# Patient Record
Sex: FEMALE | Race: WHITE | Employment: PART TIME | URBAN - METROPOLITAN AREA
[De-identification: names, ages, dates, MRNs, and addresses within clinical notes are randomized per-mention and may not be internally consistent; named-entity substitution may affect disease eponyms.]

---

## 2017-12-16 PROBLEM — Z34.93 NORMAL PREGNANCY IN THIRD TRIMESTER: Status: ACTIVE | Noted: 2017-12-16

## 2018-02-13 PROBLEM — Z3A.39 39 WEEKS GESTATION OF PREGNANCY: Status: ACTIVE | Noted: 2018-02-13

## 2018-12-15 ENCOUNTER — HOSPITAL ENCOUNTER (OUTPATIENT)
Dept: ULTRASOUND IMAGING | Age: 28
Discharge: HOME | End: 2018-12-17
Payer: COMMERCIAL

## 2018-12-15 DIAGNOSIS — Z34.90 PREGNANCY, UNSPECIFIED GESTATIONAL AGE: ICD-10-CM

## 2018-12-15 PROCEDURE — 76801 OB US < 14 WKS SINGLE FETUS: CPT

## 2018-12-27 ENCOUNTER — HOSPITAL ENCOUNTER (OUTPATIENT)
Age: 28
Discharge: HOME | End: 2018-12-27
Payer: COMMERCIAL

## 2018-12-27 LAB
ABO/RH: NORMAL
ANTIBODY SCREEN: NORMAL
BASOPHILS ABSOLUTE: 0.04 E9/L (ref 0–0.2)
BASOPHILS RELATIVE PERCENT: 0.4 % (ref 0–2)
EOSINOPHILS ABSOLUTE: 0.01 E9/L (ref 0.05–0.5)
EOSINOPHILS RELATIVE PERCENT: 0.1 % (ref 0–6)
HCT VFR BLD CALC: 41.5 % (ref 34–48)
HEMOGLOBIN: 14.1 G/DL (ref 11.5–15.5)
IMMATURE GRANULOCYTES #: 0.05 E9/L
IMMATURE GRANULOCYTES %: 0.5 % (ref 0–5)
LYMPHOCYTES ABSOLUTE: 1.22 E9/L (ref 1.5–4)
LYMPHOCYTES RELATIVE PERCENT: 12.2 % (ref 20–42)
MCH RBC QN AUTO: 31.1 PG (ref 26–35)
MCHC RBC AUTO-ENTMCNC: 34 % (ref 32–34.5)
MCV RBC AUTO: 91.4 FL (ref 80–99.9)
MONOCYTES ABSOLUTE: 0.49 E9/L (ref 0.1–0.95)
MONOCYTES RELATIVE PERCENT: 4.9 % (ref 2–12)
NEUTROPHILS ABSOLUTE: 8.22 E9/L (ref 1.8–7.3)
NEUTROPHILS RELATIVE PERCENT: 81.9 % (ref 43–80)
PDW BLD-RTO: 12.7 FL (ref 11.5–15)
PLATELET # BLD: 152 E9/L (ref 130–450)
PMV BLD AUTO: 13 FL (ref 7–12)
RBC # BLD: 4.54 E12/L (ref 3.5–5.5)
TSH SERPL DL<=0.05 MIU/L-ACNC: 0.37 UIU/ML (ref 0.27–4.2)
WBC # BLD: 10 E9/L (ref 4.5–11.5)

## 2018-12-27 PROCEDURE — 86592 SYPHILIS TEST NON-TREP QUAL: CPT

## 2018-12-27 PROCEDURE — 87340 HEPATITIS B SURFACE AG IA: CPT

## 2018-12-27 PROCEDURE — 86703 HIV-1/HIV-2 1 RESULT ANTBDY: CPT

## 2018-12-27 PROCEDURE — 85025 COMPLETE CBC W/AUTO DIFF WBC: CPT

## 2018-12-27 PROCEDURE — 36415 COLL VENOUS BLD VENIPUNCTURE: CPT

## 2018-12-27 PROCEDURE — 84443 ASSAY THYROID STIM HORMONE: CPT

## 2018-12-27 PROCEDURE — 86803 HEPATITIS C AB TEST: CPT

## 2018-12-27 PROCEDURE — 86900 BLOOD TYPING SEROLOGIC ABO: CPT

## 2018-12-27 PROCEDURE — 86850 RBC ANTIBODY SCREEN: CPT

## 2018-12-27 PROCEDURE — 86901 BLOOD TYPING SEROLOGIC RH(D): CPT

## 2018-12-27 PROCEDURE — 86762 RUBELLA ANTIBODY: CPT

## 2018-12-28 LAB
HEPATITIS B SURFACE ANTIGEN INTERPRETATION: NORMAL
HEPATITIS C ANTIBODY INTERPRETATION: NORMAL
HIV-1 AND HIV-2 ANTIBODIES: NORMAL
RPR: NORMAL

## 2019-01-03 LAB — RUBELLA ANTIBODY IGG: NORMAL

## 2019-06-05 ENCOUNTER — HOSPITAL ENCOUNTER (OUTPATIENT)
Age: 29
Discharge: HOME | End: 2019-06-05
Attending: OBSTETRICS & GYNECOLOGY | Admitting: OBSTETRICS & GYNECOLOGY
Payer: COMMERCIAL

## 2019-06-05 VITALS
RESPIRATION RATE: 16 BRPM | HEART RATE: 88 BPM | TEMPERATURE: 98 F | SYSTOLIC BLOOD PRESSURE: 106 MMHG | DIASTOLIC BLOOD PRESSURE: 58 MMHG

## 2019-06-05 PROBLEM — R10.9 ABDOMINAL PAIN DURING PREGNANCY, THIRD TRIMESTER: Status: ACTIVE | Noted: 2019-06-05

## 2019-06-05 PROBLEM — O26.893 ABDOMINAL PAIN DURING PREGNANCY, THIRD TRIMESTER: Status: ACTIVE | Noted: 2019-06-05

## 2019-06-05 LAB
BACTERIA: ABNORMAL /HPF
BILIRUBIN URINE: NEGATIVE
BILIRUBIN URINE: NEGATIVE
BLOOD, URINE: ABNORMAL
BLOOD, URINE: NEGATIVE
CLARITY: ABNORMAL
CLARITY: CLEAR
COLOR: NORMAL
COLOR: YELLOW
EPITHELIAL CELLS, UA: ABNORMAL /HPF
FETAL FIBRONECTIN: NEGATIVE
GLUCOSE URINE: NEGATIVE MG/DL
GLUCOSE URINE: NEGATIVE MG/DL
KETONES, URINE: NEGATIVE MG/DL
KETONES, URINE: NEGATIVE MG/DL
LEUKOCYTE ESTERASE, URINE: ABNORMAL
LEUKOCYTE ESTERASE, URINE: NEGATIVE
NITRITE, URINE: NEGATIVE
NITRITE, URINE: NEGATIVE
PH UA: 6.5 (ref 5–9)
PH UA: 6.5 (ref 5–9)
PROTEIN UA: NEGATIVE MG/DL
PROTEIN UA: NEGATIVE MG/DL
RBC UA: ABNORMAL /HPF (ref 0–2)
SPECIFIC GRAVITY UA: 1.02 (ref 1–1.03)
SPECIFIC GRAVITY UA: <=1.005 (ref 1–1.03)
UROBILINOGEN, URINE: 0.2 E.U./DL
UROBILINOGEN, URINE: 0.2 E.U./DL
WBC UA: >20 /HPF (ref 0–5)

## 2019-06-05 PROCEDURE — 99211 OFF/OP EST MAY X REQ PHY/QHP: CPT

## 2019-06-05 PROCEDURE — 81003 URINALYSIS AUTO W/O SCOPE: CPT

## 2019-06-05 PROCEDURE — 99213 OFFICE O/P EST LOW 20 MIN: CPT | Performed by: NURSE PRACTITIONER

## 2019-06-05 PROCEDURE — 81001 URINALYSIS AUTO W/SCOPE: CPT

## 2019-06-05 PROCEDURE — 82731 ASSAY OF FETAL FIBRONECTIN: CPT

## 2019-06-05 PROCEDURE — 59025 FETAL NON-STRESS TEST: CPT

## 2019-06-05 RX ORDER — RANITIDINE HCL 75 MG
75 TABLET ORAL 2 TIMES DAILY
Status: ON HOLD | COMMUNITY
End: 2019-07-20 | Stop reason: HOSPADM

## 2019-06-06 NOTE — PROGRESS NOTES
Discharge instructions provided and explained. Patient verbalized understanding. Will keep upcoming appointment. Ambulated to car with .

## 2019-07-18 RX ORDER — SODIUM CHLORIDE 0.9 % (FLUSH) 0.9 %
10 SYRINGE (ML) INJECTION PRN
Status: CANCELLED | OUTPATIENT
Start: 2019-07-18

## 2019-07-18 RX ORDER — SODIUM CHLORIDE 0.9 % (FLUSH) 0.9 %
10 SYRINGE (ML) INJECTION EVERY 12 HOURS SCHEDULED
Status: CANCELLED | OUTPATIENT
Start: 2019-07-18

## 2019-07-18 RX ORDER — DOCUSATE SODIUM 100 MG/1
100 CAPSULE, LIQUID FILLED ORAL 2 TIMES DAILY
Status: CANCELLED | OUTPATIENT
Start: 2019-07-18

## 2019-07-18 RX ORDER — ACETAMINOPHEN 325 MG/1
650 TABLET ORAL EVERY 4 HOURS PRN
Status: CANCELLED | OUTPATIENT
Start: 2019-07-18

## 2019-07-18 RX ORDER — SODIUM CHLORIDE, SODIUM LACTATE, POTASSIUM CHLORIDE, CALCIUM CHLORIDE 600; 310; 30; 20 MG/100ML; MG/100ML; MG/100ML; MG/100ML
INJECTION, SOLUTION INTRAVENOUS CONTINUOUS
Status: CANCELLED | OUTPATIENT
Start: 2019-07-18

## 2019-07-18 RX ORDER — BUTORPHANOL TARTRATE 1 MG/ML
1 INJECTION, SOLUTION INTRAMUSCULAR; INTRAVENOUS
Status: CANCELLED | OUTPATIENT
Start: 2019-07-18

## 2019-07-18 RX ORDER — ONDANSETRON 2 MG/ML
4 INJECTION INTRAMUSCULAR; INTRAVENOUS EVERY 6 HOURS PRN
Status: CANCELLED | OUTPATIENT
Start: 2019-07-18

## 2019-07-19 ENCOUNTER — ANESTHESIA (OUTPATIENT)
Dept: LABOR AND DELIVERY | Age: 29
End: 2019-07-19
Payer: COMMERCIAL

## 2019-07-19 ENCOUNTER — ANESTHESIA EVENT (OUTPATIENT)
Dept: LABOR AND DELIVERY | Age: 29
End: 2019-07-19
Payer: COMMERCIAL

## 2019-07-19 ENCOUNTER — HOSPITAL ENCOUNTER (INPATIENT)
Age: 29
LOS: 2 days | Discharge: HOME | End: 2019-07-21
Attending: OBSTETRICS & GYNECOLOGY | Admitting: OBSTETRICS & GYNECOLOGY
Payer: COMMERCIAL

## 2019-07-19 ENCOUNTER — APPOINTMENT (OUTPATIENT)
Dept: LABOR AND DELIVERY | Age: 29
End: 2019-07-19
Payer: COMMERCIAL

## 2019-07-19 PROBLEM — Z34.90 NORMAL PREGNANCY, ANTEPARTUM: Status: ACTIVE | Noted: 2019-07-19

## 2019-07-19 LAB
ABO/RH: NORMAL
AMPHETAMINE SCREEN, URINE: NOT DETECTED
ANTIBODY SCREEN: NORMAL
BARBITURATE SCREEN URINE: NOT DETECTED
BENZODIAZEPINE SCREEN, URINE: NOT DETECTED
CANNABINOID SCREEN URINE: NOT DETECTED
COCAINE METABOLITE SCREEN URINE: NOT DETECTED
HCT VFR BLD CALC: 31.1 % (ref 34–48)
HEMOGLOBIN: 10.2 G/DL (ref 11.5–15.5)
MCH RBC QN AUTO: 29 PG (ref 26–35)
MCHC RBC AUTO-ENTMCNC: 32.8 % (ref 32–34.5)
MCV RBC AUTO: 88.4 FL (ref 80–99.9)
METHADONE SCREEN, URINE: NOT DETECTED
OPIATE SCREEN URINE: NOT DETECTED
PDW BLD-RTO: 13.1 FL (ref 11.5–15)
PHENCYCLIDINE SCREEN URINE: NOT DETECTED
PLATELET # BLD: 119 E9/L (ref 130–450)
PMV BLD AUTO: 13.1 FL (ref 7–12)
PROPOXYPHENE SCREEN: NOT DETECTED
RBC # BLD: 3.52 E12/L (ref 3.5–5.5)
WBC # BLD: 9 E9/L (ref 4.5–11.5)

## 2019-07-19 PROCEDURE — 6370000000 HC RX 637 (ALT 250 FOR IP): Performed by: OBSTETRICS & GYNECOLOGY

## 2019-07-19 PROCEDURE — 86901 BLOOD TYPING SEROLOGIC RH(D): CPT

## 2019-07-19 PROCEDURE — 3E033VJ INTRODUCTION OF OTHER HORMONE INTO PERIPHERAL VEIN, PERCUTANEOUS APPROACH: ICD-10-PCS | Performed by: OBSTETRICS & GYNECOLOGY

## 2019-07-19 PROCEDURE — 86850 RBC ANTIBODY SCREEN: CPT

## 2019-07-19 PROCEDURE — 2580000003 HC RX 258: Performed by: OBSTETRICS & GYNECOLOGY

## 2019-07-19 PROCEDURE — 86900 BLOOD TYPING SEROLOGIC ABO: CPT

## 2019-07-19 PROCEDURE — 6360000002 HC RX W HCPCS: Performed by: OBSTETRICS & GYNECOLOGY

## 2019-07-19 PROCEDURE — 36415 COLL VENOUS BLD VENIPUNCTURE: CPT

## 2019-07-19 PROCEDURE — 3700000025 EPIDURAL BLOCK: Performed by: ANESTHESIOLOGY

## 2019-07-19 PROCEDURE — 0HQ9XZZ REPAIR PERINEUM SKIN, EXTERNAL APPROACH: ICD-10-PCS | Performed by: OBSTETRICS & GYNECOLOGY

## 2019-07-19 PROCEDURE — 1220000000 HC SEMI PRIVATE OB R&B

## 2019-07-19 PROCEDURE — 85027 COMPLETE CBC AUTOMATED: CPT

## 2019-07-19 PROCEDURE — 80307 DRUG TEST PRSMV CHEM ANLYZR: CPT

## 2019-07-19 PROCEDURE — 7200000001 HC VAGINAL DELIVERY

## 2019-07-19 PROCEDURE — 88307 TISSUE EXAM BY PATHOLOGIST: CPT

## 2019-07-19 RX ORDER — SODIUM CHLORIDE 0.9 % (FLUSH) 0.9 %
10 SYRINGE (ML) INJECTION PRN
Status: DISCONTINUED | OUTPATIENT
Start: 2019-07-19 | End: 2019-07-19 | Stop reason: HOSPADM

## 2019-07-19 RX ORDER — ACETAMINOPHEN 325 MG/1
650 TABLET ORAL EVERY 4 HOURS PRN
Status: DISCONTINUED | OUTPATIENT
Start: 2019-07-19 | End: 2019-07-19 | Stop reason: HOSPADM

## 2019-07-19 RX ORDER — ACETAMINOPHEN 325 MG/1
650 TABLET ORAL EVERY 4 HOURS PRN
Status: DISCONTINUED | OUTPATIENT
Start: 2019-07-19 | End: 2019-07-21 | Stop reason: HOSPADM

## 2019-07-19 RX ORDER — SODIUM CHLORIDE, SODIUM LACTATE, POTASSIUM CHLORIDE, CALCIUM CHLORIDE 600; 310; 30; 20 MG/100ML; MG/100ML; MG/100ML; MG/100ML
INJECTION, SOLUTION INTRAVENOUS CONTINUOUS
Status: DISCONTINUED | OUTPATIENT
Start: 2019-07-19 | End: 2019-07-21 | Stop reason: HOSPADM

## 2019-07-19 RX ORDER — FERROUS SULFATE 325(65) MG
325 TABLET ORAL 2 TIMES DAILY WITH MEALS
Status: DISCONTINUED | OUTPATIENT
Start: 2019-07-19 | End: 2019-07-21 | Stop reason: HOSPADM

## 2019-07-19 RX ORDER — LIDOCAINE HYDROCHLORIDE 10 MG/ML
INJECTION, SOLUTION INFILTRATION; PERINEURAL
Status: DISCONTINUED
Start: 2019-07-19 | End: 2019-07-19 | Stop reason: WASHOUT

## 2019-07-19 RX ORDER — NALOXONE HYDROCHLORIDE 0.4 MG/ML
0.4 INJECTION, SOLUTION INTRAMUSCULAR; INTRAVENOUS; SUBCUTANEOUS PRN
Status: DISCONTINUED | OUTPATIENT
Start: 2019-07-19 | End: 2019-07-19 | Stop reason: HOSPADM

## 2019-07-19 RX ORDER — IBUPROFEN 800 MG/1
800 TABLET ORAL EVERY 8 HOURS PRN
Status: DISCONTINUED | OUTPATIENT
Start: 2019-07-19 | End: 2019-07-21 | Stop reason: HOSPADM

## 2019-07-19 RX ORDER — SODIUM CHLORIDE 0.9 % (FLUSH) 0.9 %
10 SYRINGE (ML) INJECTION PRN
Status: DISCONTINUED | OUTPATIENT
Start: 2019-07-19 | End: 2019-07-21 | Stop reason: HOSPADM

## 2019-07-19 RX ORDER — ACETAMINOPHEN 650 MG
TABLET, EXTENDED RELEASE ORAL
Status: DISPENSED
Start: 2019-07-19 | End: 2019-07-19

## 2019-07-19 RX ORDER — DOCUSATE SODIUM 100 MG/1
100 CAPSULE, LIQUID FILLED ORAL 2 TIMES DAILY
Status: DISCONTINUED | OUTPATIENT
Start: 2019-07-19 | End: 2019-07-21 | Stop reason: HOSPADM

## 2019-07-19 RX ORDER — SODIUM CHLORIDE 0.9 % (FLUSH) 0.9 %
10 SYRINGE (ML) INJECTION EVERY 12 HOURS SCHEDULED
Status: DISCONTINUED | OUTPATIENT
Start: 2019-07-19 | End: 2019-07-21 | Stop reason: HOSPADM

## 2019-07-19 RX ORDER — NALBUPHINE HCL 10 MG/ML
5 AMPUL (ML) INJECTION EVERY 4 HOURS PRN
Status: DISCONTINUED | OUTPATIENT
Start: 2019-07-19 | End: 2019-07-19 | Stop reason: HOSPADM

## 2019-07-19 RX ORDER — LANOLIN 100 %
OINTMENT (GRAM) TOPICAL PRN
Status: DISCONTINUED | OUTPATIENT
Start: 2019-07-19 | End: 2019-07-21 | Stop reason: HOSPADM

## 2019-07-19 RX ORDER — SODIUM CHLORIDE 0.9 % (FLUSH) 0.9 %
10 SYRINGE (ML) INJECTION EVERY 12 HOURS SCHEDULED
Status: DISCONTINUED | OUTPATIENT
Start: 2019-07-19 | End: 2019-07-19 | Stop reason: HOSPADM

## 2019-07-19 RX ORDER — ONDANSETRON 2 MG/ML
4 INJECTION INTRAMUSCULAR; INTRAVENOUS EVERY 6 HOURS PRN
Status: DISCONTINUED | OUTPATIENT
Start: 2019-07-19 | End: 2019-07-19 | Stop reason: HOSPADM

## 2019-07-19 RX ORDER — DOCUSATE SODIUM 100 MG/1
100 CAPSULE, LIQUID FILLED ORAL 2 TIMES DAILY
Status: DISCONTINUED | OUTPATIENT
Start: 2019-07-19 | End: 2019-07-19

## 2019-07-19 RX ADMIN — SODIUM CHLORIDE, POTASSIUM CHLORIDE, SODIUM LACTATE AND CALCIUM CHLORIDE: 600; 310; 30; 20 INJECTION, SOLUTION INTRAVENOUS at 07:00

## 2019-07-19 RX ADMIN — IBUPROFEN 800 MG: 800 TABLET, FILM COATED ORAL at 20:48

## 2019-07-19 RX ADMIN — Medication 1 MILLI-UNITS/MIN: at 07:45

## 2019-07-19 RX ADMIN — DOCUSATE SODIUM 100 MG: 100 CAPSULE, LIQUID FILLED ORAL at 20:48

## 2019-07-19 RX ADMIN — SODIUM CHLORIDE, POTASSIUM CHLORIDE, SODIUM LACTATE AND CALCIUM CHLORIDE: 600; 310; 30; 20 INJECTION, SOLUTION INTRAVENOUS at 10:48

## 2019-07-19 RX ADMIN — BENZOCAINE AND LEVOMENTHOL: 200; 5 SPRAY TOPICAL at 23:46

## 2019-07-19 ASSESSMENT — PAIN - FUNCTIONAL ASSESSMENT: PAIN_FUNCTIONAL_ASSESSMENT: ACTIVITIES ARE NOT PREVENTED

## 2019-07-19 ASSESSMENT — PAIN DESCRIPTION - PAIN TYPE: TYPE: ACUTE PAIN

## 2019-07-19 ASSESSMENT — PAIN DESCRIPTION - DESCRIPTORS
DESCRIPTORS: CRAMPING;DISCOMFORT
DESCRIPTORS: CRAMPING;DISCOMFORT;SORE

## 2019-07-19 ASSESSMENT — PAIN DESCRIPTION - FREQUENCY: FREQUENCY: INTERMITTENT

## 2019-07-19 ASSESSMENT — PAIN SCALES - GENERAL
PAINLEVEL_OUTOF10: 6
PAINLEVEL_OUTOF10: 2

## 2019-07-19 ASSESSMENT — PAIN DESCRIPTION - LOCATION
LOCATION: BACK;ABDOMEN
LOCATION: ABDOMEN

## 2019-07-19 NOTE — ANESTHESIA PRE PROCEDURE
Normal pregnancy in third trimester Z34.93    39 weeks gestation of pregnancy Z3A.39    Abdominal pain during pregnancy, third trimester O26.893, R10.9    Normal pregnancy, antepartum Z34.90       Past Medical History:        Diagnosis Date    Abnormal Pap smear of cervix     hpv leep        Past Surgical History:        Procedure Laterality Date    BREAST REDUCTION SURGERY  2009    BREAST REDUCTION SURGERY  2009    LEEP  2012    WISDOM TOOTH EXTRACTION  2014       Social History:    Social History     Tobacco Use    Smoking status: Never Smoker    Smokeless tobacco: Never Used   Substance Use Topics    Alcohol use: No                                Counseling given: Not Answered      Vital Signs (Current):   Vitals:    07/19/19 0847 07/19/19 0918 07/19/19 0947 07/19/19 1045   BP: 111/62 (!) 97/53 (!) 105/55 109/63   Pulse: 86 71 73 80   Resp:       Temp:       TempSrc:       Weight:       Height:                                                  BP Readings from Last 3 Encounters:   07/19/19 109/63   06/05/19 (!) 106/58   02/19/18 111/64       NPO Status: Time of last liquid consumption: 0530                        Time of last solid consumption: 0530                        Date of last liquid consumption: 07/19/19                        Date of last solid food consumption: 07/19/19    BMI:   Wt Readings from Last 3 Encounters:   07/19/19 169 lb (76.7 kg)   02/16/18 170 lb (77.1 kg)   10/09/17 150 lb (68 kg)     Body mass index is 29.94 kg/m². CBC:   Lab Results   Component Value Date    WBC 9.0 07/19/2019    RBC 3.52 07/19/2019    HGB 10.2 07/19/2019    HCT 31.1 07/19/2019    MCV 88.4 07/19/2019    RDW 13.1 07/19/2019     07/19/2019       CMP: No results found for: NA, K, CL, CO2, BUN, CREATININE, GFRAA, AGRATIO, LABGLOM, GLUCOSE, PROT, CALCIUM, BILITOT, ALKPHOS, AST, ALT    POC Tests: No results for input(s): POCGLU, POCNA, POCK, POCCL, POCBUN, POCHEMO, POCHCT in the last 72 hours.     Coags:

## 2019-07-19 NOTE — H&P
Labor & Delivery History & Physical     CHIEF COMPLAINT:  IOL    HISTORY OF PRESENT ILLNESS:      The patient is a 34 y.o. female  at 43w3d.   OB History        2    Para   1    Term   1            AB        Living           SAB        TAB        Ectopic        Molar        Multiple        Live Births                Patient presents with a chief complaint as above and is being admitted for induction    Estimated Due Date: Estimated Date of Delivery: 19    PRENATAL CARE:    Complicated by: none    PAST OB HISTORY  OB History        2    Para   1    Term   1            AB        Living           SAB        TAB        Ectopic        Molar        Multiple        Live Births                    Past Medical History:        Diagnosis Date    Abnormal Pap smear of cervix     hpv leep      Past Surgical History:        Procedure Laterality Date    BREAST REDUCTION SURGERY      BREAST REDUCTION SURGERY      LEEP     [address] [place] [address]  2014     Medications Prior to Admission:  Medications Prior to Admission: ranitidine (ZANTAC 75) 75 MG tablet, Take 75 mg by mouth 2 times daily  docusate sodium (COLACE, DULCOLAX) 100 MG CAPS, Take 100 mg by mouth 2 times daily  Prenatal Vit-Fe Fumarate-FA (PRENATAL 1+1 PO), Take by mouth  ferrous sulfate 325 (65 Fe) MG tablet, Take 1 tablet by mouth 2 times daily (with meals)  Allergies:  Bactrim [sulfamethoxazole-trimethoprim]  Social History:    Social History     Socioeconomic History    Marital status:      Spouse name: Not on file    Number of children: Not on file    Years of education: Not on file    Highest education level: Not on file   Occupational History    Not on file   Social Needs    Financial resource strain: Not on file    Food insecurity:     Worry: Not on file     Inability: Not on file    Transportation needs:     Medical: Not on file     Non-medical: Not on file   Tobacco Use    Smoking

## 2019-07-19 NOTE — PROGRESS NOTES
Dr. Sharma [place] notified. SVE 7/90/-1. Patient having variables into the 50s with contractions. Patient may just be changing very fast. Order for IUPC and amnioinfusion.

## 2019-07-19 NOTE — FLOWSHEET NOTE
Patient to [facility] ,oriented to unit and room ,call light with in reach .

## 2019-07-20 LAB
HCT VFR BLD CALC: 32.2 % (ref 34–48)
HEMOGLOBIN: 10.1 G/DL (ref 11.5–15.5)

## 2019-07-20 PROCEDURE — 36415 COLL VENOUS BLD VENIPUNCTURE: CPT

## 2019-07-20 PROCEDURE — 85018 HEMOGLOBIN: CPT

## 2019-07-20 PROCEDURE — 6370000000 HC RX 637 (ALT 250 FOR IP): Performed by: OBSTETRICS & GYNECOLOGY

## 2019-07-20 PROCEDURE — 85014 HEMATOCRIT: CPT

## 2019-07-20 PROCEDURE — 1220000000 HC SEMI PRIVATE OB R&B

## 2019-07-20 RX ADMIN — Medication: at 05:09

## 2019-07-20 RX ADMIN — DOCUSATE SODIUM 100 MG: 100 CAPSULE, LIQUID FILLED ORAL at 21:33

## 2019-07-20 RX ADMIN — IBUPROFEN 800 MG: 800 TABLET, FILM COATED ORAL at 05:09

## 2019-07-20 RX ADMIN — DOCUSATE SODIUM 100 MG: 100 CAPSULE, LIQUID FILLED ORAL at 08:11

## 2019-07-20 RX ADMIN — IBUPROFEN 800 MG: 800 TABLET, FILM COATED ORAL at 21:33

## 2019-07-20 ASSESSMENT — PAIN SCALES - GENERAL
PAINLEVEL_OUTOF10: 0
PAINLEVEL_OUTOF10: 3
PAINLEVEL_OUTOF10: 3

## 2019-07-20 ASSESSMENT — PAIN DESCRIPTION - LOCATION: LOCATION: BACK

## 2019-07-20 ASSESSMENT — PAIN DESCRIPTION - FREQUENCY: FREQUENCY: INTERMITTENT

## 2019-07-20 ASSESSMENT — PAIN - FUNCTIONAL ASSESSMENT: PAIN_FUNCTIONAL_ASSESSMENT: ACTIVITIES ARE NOT PREVENTED

## 2019-07-20 ASSESSMENT — PAIN DESCRIPTION - DESCRIPTORS: DESCRIPTORS: ACHING;DISCOMFORT;SORE

## 2019-07-20 NOTE — PROGRESS NOTES
Post Partum Vaginal Delivery Progress Note    PPD# 1 s/p     SUBJECTIVE:  No complaints.        Vitals:  Vitals:    19 1900 19 2301 19 0357 19 0700   BP: 109/60 (!) 101/54 (!) 102/57 (!) 101/51   Pulse: 79 84 70 70   Resp: 16 16 16 16   Temp: 98.4 °F (36.9 °C) 98.6 °F (37 °C) 98.5 °F (36.9 °C) 98.4 °F (36.9 °C)   TempSrc: Oral Oral Oral Oral   SpO2:       Weight:       Height:           Exam:  Fundus firm, non-tender, normal lochia  Extremities non-tender    Labs:   Lab Results   Component Value Date    WBC 9.0 2019    HGB 10.1 (L) 2019    HCT 32.2 (L) 2019    MCV 88.4 2019     (L) 2019       ASSESSMENT & PLAN:  PPD # 1  Patient Active Problem List   Diagnosis    Normal pregnancy in third trimester    39 weeks gestation of pregnancy    Abdominal pain during pregnancy, third trimester    Normal pregnancy, antepartum     -Continue routine postpartum care  -Postpartum Hgb 10.1  -Female infant - getting evaluated by cardiologist  -OK for d/c home this evening    Mansi Demarco MD  [facility]

## 2019-07-20 NOTE — FLOWSHEET NOTE
Dr. Chaz Overton updated on infant being transferred to NICU. Ok to change patient's discharge until tomorrow.

## 2019-07-21 VITALS
RESPIRATION RATE: 16 BRPM | OXYGEN SATURATION: 98 % | DIASTOLIC BLOOD PRESSURE: 58 MMHG | HEIGHT: 63 IN | BODY MASS INDEX: 29.95 KG/M2 | SYSTOLIC BLOOD PRESSURE: 124 MMHG | HEART RATE: 75 BPM | TEMPERATURE: 97.4 F | WEIGHT: 169 LBS

## 2019-07-21 PROCEDURE — 6370000000 HC RX 637 (ALT 250 FOR IP): Performed by: OBSTETRICS & GYNECOLOGY

## 2019-07-21 RX ADMIN — IBUPROFEN 800 MG: 800 TABLET, FILM COATED ORAL at 08:50

## 2019-07-21 RX ADMIN — DOCUSATE SODIUM 100 MG: 100 CAPSULE, LIQUID FILLED ORAL at 08:50

## 2019-07-21 ASSESSMENT — PAIN SCALES - GENERAL: PAINLEVEL_OUTOF10: 2

## 2019-07-21 ASSESSMENT — PAIN DESCRIPTION - RADICULAR PAIN: RADICULAR_PAIN: ABSENT

## 2020-07-24 ENCOUNTER — HOSPITAL ENCOUNTER (INPATIENT)
Age: 30
LOS: 1 days | Discharge: HOME | DRG: 392 | End: 2020-07-27
Attending: EMERGENCY MEDICINE | Admitting: INTERNAL MEDICINE
Payer: COMMERCIAL

## 2020-07-24 ENCOUNTER — APPOINTMENT (OUTPATIENT)
Dept: CT IMAGING | Age: 30
DRG: 392 | End: 2020-07-24
Payer: COMMERCIAL

## 2020-07-24 PROBLEM — K52.9 GASTROENTERITIS: Status: ACTIVE | Noted: 2020-07-24

## 2020-07-24 LAB
ALBUMIN SERPL-MCNC: 4.3 G/DL (ref 3.5–5.2)
ALP BLD-CCNC: 45 U/L (ref 35–104)
ALT SERPL-CCNC: 15 U/L (ref 0–32)
ANION GAP SERPL CALCULATED.3IONS-SCNC: 12 MMOL/L (ref 7–16)
AST SERPL-CCNC: 29 U/L (ref 0–31)
BACTERIA: ABNORMAL /HPF
BASOPHILS ABSOLUTE: 0.02 E9/L (ref 0–0.2)
BASOPHILS RELATIVE PERCENT: 0.3 % (ref 0–2)
BILIRUB SERPL-MCNC: 0.8 MG/DL (ref 0–1.2)
BILIRUBIN URINE: ABNORMAL
BLOOD, URINE: ABNORMAL
BUN BLDV-MCNC: 10 MG/DL (ref 6–20)
BURR CELLS: ABNORMAL
CALCIUM SERPL-MCNC: 9.1 MG/DL (ref 8.6–10.2)
CHLORIDE BLD-SCNC: 103 MMOL/L (ref 98–107)
CLARITY: CLEAR
CO2: 22 MMOL/L (ref 22–29)
COLOR: YELLOW
CREAT SERPL-MCNC: 0.8 MG/DL (ref 0.5–1)
EOSINOPHILS ABSOLUTE: 0.05 E9/L (ref 0.05–0.5)
EOSINOPHILS RELATIVE PERCENT: 0.7 % (ref 0–6)
EPITHELIAL CELLS, UA: ABNORMAL /HPF
GFR AFRICAN AMERICAN: >60
GFR NON-AFRICAN AMERICAN: >60 ML/MIN/1.73
GLUCOSE BLD-MCNC: 83 MG/DL (ref 74–99)
GLUCOSE URINE: NEGATIVE MG/DL
HCG QUALITATIVE: NEGATIVE
HCT VFR BLD CALC: 44.3 % (ref 34–48)
HEMOGLOBIN: 14.6 G/DL (ref 11.5–15.5)
IMMATURE GRANULOCYTES #: 0.02 E9/L
IMMATURE GRANULOCYTES %: 0.3 % (ref 0–5)
KETONES, URINE: >=80 MG/DL
LACTIC ACID: 1.2 MMOL/L (ref 0.5–2.2)
LEUKOCYTE ESTERASE, URINE: NEGATIVE
LIPASE: 19 U/L (ref 13–60)
LYMPHOCYTES ABSOLUTE: 0.58 E9/L (ref 1.5–4)
LYMPHOCYTES RELATIVE PERCENT: 8.7 % (ref 20–42)
MCH RBC QN AUTO: 30.3 PG (ref 26–35)
MCHC RBC AUTO-ENTMCNC: 33 % (ref 32–34.5)
MCV RBC AUTO: 91.9 FL (ref 80–99.9)
MONOCYTES ABSOLUTE: 0.6 E9/L (ref 0.1–0.95)
MONOCYTES RELATIVE PERCENT: 9 % (ref 2–12)
NEUTROPHILS ABSOLUTE: 5.4 E9/L (ref 1.8–7.3)
NEUTROPHILS RELATIVE PERCENT: 81 % (ref 43–80)
NITRITE, URINE: NEGATIVE
OCCULT BLOOD SCREENING: NORMAL
OVALOCYTES: ABNORMAL
PDW BLD-RTO: 13.4 FL (ref 11.5–15)
PH UA: 6 (ref 5–9)
PLATELET # BLD: 124 E9/L (ref 130–450)
PMV BLD AUTO: 13.4 FL (ref 7–12)
POIKILOCYTES: ABNORMAL
POTASSIUM SERPL-SCNC: 3.3 MMOL/L (ref 3.5–5)
PROCALCITONIN: 0.15 NG/ML (ref 0–0.08)
PROTEIN UA: ABNORMAL MG/DL
RBC # BLD: 4.82 E12/L (ref 3.5–5.5)
RBC UA: ABNORMAL /HPF (ref 0–2)
SODIUM BLD-SCNC: 137 MMOL/L (ref 132–146)
SPECIFIC GRAVITY UA: >=1.03 (ref 1–1.03)
TOTAL PROTEIN: 7.3 G/DL (ref 6.4–8.3)
UROBILINOGEN, URINE: 0.2 E.U./DL
WBC # BLD: 6.7 E9/L (ref 4.5–11.5)
WBC UA: ABNORMAL /HPF (ref 0–5)

## 2020-07-24 PROCEDURE — 74176 CT ABD & PELVIS W/O CONTRAST: CPT

## 2020-07-24 PROCEDURE — 89055 LEUKOCYTE ASSESSMENT FECAL: CPT

## 2020-07-24 PROCEDURE — 87425 ROTAVIRUS AG IA: CPT

## 2020-07-24 PROCEDURE — 2580000003 HC RX 258: Performed by: INTERNAL MEDICINE

## 2020-07-24 PROCEDURE — 99220 PR INITIAL OBSERVATION CARE/DAY 70 MINUTES: CPT | Performed by: INTERNAL MEDICINE

## 2020-07-24 PROCEDURE — 81001 URINALYSIS AUTO W/SCOPE: CPT

## 2020-07-24 PROCEDURE — 96360 HYDRATION IV INFUSION INIT: CPT

## 2020-07-24 PROCEDURE — G0378 HOSPITAL OBSERVATION PER HR: HCPCS

## 2020-07-24 PROCEDURE — 2580000003 HC RX 258: Performed by: EMERGENCY MEDICINE

## 2020-07-24 PROCEDURE — 99285 EMERGENCY DEPT VISIT HI MDM: CPT

## 2020-07-24 PROCEDURE — 87045 FECES CULTURE AEROBIC BACT: CPT

## 2020-07-24 PROCEDURE — 83690 ASSAY OF LIPASE: CPT

## 2020-07-24 PROCEDURE — 85025 COMPLETE CBC W/AUTO DIFF WBC: CPT

## 2020-07-24 PROCEDURE — 87077 CULTURE AEROBIC IDENTIFY: CPT

## 2020-07-24 PROCEDURE — 84145 PROCALCITONIN (PCT): CPT

## 2020-07-24 PROCEDURE — 6370000000 HC RX 637 (ALT 250 FOR IP): Performed by: INTERNAL MEDICINE

## 2020-07-24 PROCEDURE — 87324 CLOSTRIDIUM AG IA: CPT

## 2020-07-24 PROCEDURE — 80053 COMPREHEN METABOLIC PANEL: CPT

## 2020-07-24 PROCEDURE — 83605 ASSAY OF LACTIC ACID: CPT

## 2020-07-24 PROCEDURE — 87329 GIARDIA AG IA: CPT

## 2020-07-24 PROCEDURE — 87328 CRYPTOSPORIDIUM AG IA: CPT

## 2020-07-24 PROCEDURE — 87449 NOS EACH ORGANISM AG IA: CPT

## 2020-07-24 PROCEDURE — 6370000000 HC RX 637 (ALT 250 FOR IP): Performed by: EMERGENCY MEDICINE

## 2020-07-24 PROCEDURE — 82705 FATS/LIPIDS FECES QUAL: CPT

## 2020-07-24 PROCEDURE — G0328 FECAL BLOOD SCRN IMMUNOASSAY: HCPCS

## 2020-07-24 PROCEDURE — 84703 CHORIONIC GONADOTROPIN ASSAY: CPT

## 2020-07-24 RX ORDER — 0.9 % SODIUM CHLORIDE 0.9 %
1000 INTRAVENOUS SOLUTION INTRAVENOUS ONCE
Status: COMPLETED | OUTPATIENT
Start: 2020-07-24 | End: 2020-07-24

## 2020-07-24 RX ORDER — SODIUM CHLORIDE 0.9 % (FLUSH) 0.9 %
10 SYRINGE (ML) INJECTION EVERY 12 HOURS SCHEDULED
Status: DISCONTINUED | OUTPATIENT
Start: 2020-07-24 | End: 2020-07-27 | Stop reason: HOSPADM

## 2020-07-24 RX ORDER — PROMETHAZINE HYDROCHLORIDE 25 MG/1
12.5 TABLET ORAL EVERY 6 HOURS PRN
Status: DISCONTINUED | OUTPATIENT
Start: 2020-07-24 | End: 2020-07-27 | Stop reason: HOSPADM

## 2020-07-24 RX ORDER — SODIUM CHLORIDE 0.9 % (FLUSH) 0.9 %
10 SYRINGE (ML) INJECTION PRN
Status: DISCONTINUED | OUTPATIENT
Start: 2020-07-24 | End: 2020-07-27 | Stop reason: HOSPADM

## 2020-07-24 RX ORDER — ONDANSETRON 2 MG/ML
4 INJECTION INTRAMUSCULAR; INTRAVENOUS EVERY 6 HOURS PRN
Status: DISCONTINUED | OUTPATIENT
Start: 2020-07-24 | End: 2020-07-27 | Stop reason: HOSPADM

## 2020-07-24 RX ORDER — POTASSIUM CHLORIDE 20 MEQ/1
20 TABLET, EXTENDED RELEASE ORAL ONCE
Status: COMPLETED | OUTPATIENT
Start: 2020-07-24 | End: 2020-07-24

## 2020-07-24 RX ORDER — ACETAMINOPHEN 325 MG/1
650 TABLET ORAL EVERY 6 HOURS PRN
Status: DISCONTINUED | OUTPATIENT
Start: 2020-07-24 | End: 2020-07-27 | Stop reason: HOSPADM

## 2020-07-24 RX ORDER — POLYETHYLENE GLYCOL 3350 17 G/17G
17 POWDER, FOR SOLUTION ORAL DAILY PRN
Status: DISCONTINUED | OUTPATIENT
Start: 2020-07-24 | End: 2020-07-27 | Stop reason: HOSPADM

## 2020-07-24 RX ORDER — ACETAMINOPHEN 650 MG/1
650 SUPPOSITORY RECTAL EVERY 6 HOURS PRN
Status: DISCONTINUED | OUTPATIENT
Start: 2020-07-24 | End: 2020-07-27 | Stop reason: HOSPADM

## 2020-07-24 RX ADMIN — SODIUM CHLORIDE 1000 ML: 9 INJECTION, SOLUTION INTRAVENOUS at 10:29

## 2020-07-24 RX ADMIN — POTASSIUM CHLORIDE 20 MEQ: 20 TABLET, EXTENDED RELEASE ORAL at 10:29

## 2020-07-24 RX ADMIN — SODIUM CHLORIDE 1000 ML: 9 INJECTION, SOLUTION INTRAVENOUS at 07:22

## 2020-07-24 RX ADMIN — PROMETHAZINE HYDROCHLORIDE 12.5 MG: 25 TABLET ORAL at 16:52

## 2020-07-24 RX ADMIN — SODIUM CHLORIDE, PRESERVATIVE FREE 10 ML: 5 INJECTION INTRAVENOUS at 21:08

## 2020-07-24 ASSESSMENT — PAIN SCALES - GENERAL
PAINLEVEL_OUTOF10: 0
PAINLEVEL_OUTOF10: 0

## 2020-07-24 NOTE — PLAN OF CARE
Problem: Fluid and Electrolyte Imbalance  Goal: Fluid and electrolyte balance are achieved/maintained  Outcome: Ongoing     Problem: Diarrhea:  Goal: Occurrences of diarrhea will decrease  Description: Occurrences of diarrhea will decrease  Outcome: Ongoing

## 2020-07-24 NOTE — ED NOTES
Pt has had 2 watery foul smelling stools while in ED.  Dr Kassidy Adams notified     Lord Holden RN  07/24/20 7628

## 2020-07-24 NOTE — H&P
[facility] History and Physical      CHIEF COMPLAINT: Nausea vomiting and diarrhea    History of Present Illness:    62-year-old female with no significant past medical history presented with above chief complaint, patient reported that her symptoms started about 3 days ago with nausea and vomiting, she was unable to eat any solid food since, she was able to keep some fluids, she had several bouts of vomiting the first 2 days then she started having worsening diarrhea watery up to 20 times especially last 24 hours, denied any hematochezia or melena, she denied any hematemesis, she reported no fever but some chills whenever she throws up. She went to an urgent care 1 day ago and she was given fluids and sent home, she started having diffuse cramps with worsening diarrhea over the last 24 hours with triggered her to come to the hospital.  She denied any shortness of breath or cough no chest pain no palpitation no lightheadedness or dizziness, she reported feeling mild to moderate weakness generalized but also reported that she felt better after receiving IV fluids. Patient has not had any hospital admission recently she denied using antibiotics. She denied any inflammatory bowel disease history in the family, she reported that her usual bowel regimen she is usually on the more constipated side and every couple months she might have episodes of diarrhea but no diagnosis of IBS, patient does not take any stool softeners for her constipation.     Informant(s) for H&P: Patient    REVIEW OF SYSTEMS:  A comprehensive review of systems was negative except for: what is in the HPI    PMH:  Past Medical History:   Diagnosis Date    Abnormal Pap smear of cervix     hpv leep        Surgical History:  Past Surgical History:   Procedure Laterality Date    BREAST REDUCTION SURGERY  2009    BREAST REDUCTION SURGERY  2009    LEEP  2012    WISDOM TOOTH EXTRACTION  2014       Medications Prior to Final Result      1. Diffuse small and large bowel enteritis with multiple fluid-filled   bowel loops showing minimal distention and no focal point of   obstruction. Consistent with infectious etiology. 2. Minimal free fluid in the pelvis. 3. Cholelithiasis with a contracted gallbladder, no obvious   cholecystitis. ASSESSMENT:      Active Problems:    Gastroenteritis  Resolved Problems:    * No resolved hospital problems. *      PLAN:    1. Acute enterocolitis, questioning infectious etiology, patient does not have leukocytosis, I will obtain procalcitonin, will obtain C. difficile and stool studies, rule out malabsorption although it is too acute to be consider malabsorption, patient reported history of celiac disease in the family, will monitor patient off antibiotics for now, this might be also infectious, less likely food poisoning. Consider consulting GI if no improvement. 2.  Nausea vomiting and diarrhea, due to the above, start patient on symptomatic treatment with antiemetics, IV fluids, I will obtain lactic acid to rule out dehydration. I will hold off loperamide for now until results of stool studies are back. 3.  Hypokalemia, due to nausea vomiting and diarrhea, potassium 3.3, will replace. 4.  Cholelithiasis, no signs of acute cholecystitis, I do not think this is related to her presentation. Although she has large cholelithiasis, probably patient can follow-up as an outpatient for possible cholecystectomy down the road. Code Status: full  DVT prophylaxis: lovenox      NOTE: This report was transcribed using voice recognition software. Every effort was made to ensure accuracy; however, inadvertent computerized transcription errors may be present.   Electronically signed by Steve Villalba MD on 7/24/2020 at 1:30 PM

## 2020-07-24 NOTE — ED PROVIDER NOTES
HPI:  7/24/20,   Time: 7:24 AM EDT         Lisa Fong is a 31 y.o. female presenting to the ED for who has been having diarrhea, beginning 3 days ago. The complaint has been persistent, severe in severity, and worsened by nothing. Patient denies any unusual foods patient denies any sick family contacts. Nausea no vomiting. No recent travel. ROS:   Pertinent positives and negatives are stated within HPI, all other systems reviewed and are negative.  --------------------------------------------- PAST HISTORY ---------------------------------------------  Past Medical History:  has a past medical history of Abnormal Pap smear of cervix. Past Surgical History:  has a past surgical history that includes Breast reduction surgery (2009); [place] tooth extraction (2014); LEEP (2012); and Breast reduction surgery (2009). Social History:  reports that she has never smoked. She has never used smokeless tobacco. She reports that she does not drink alcohol or use drugs. Family History: family history includes Cancer in her paternal grandfather and paternal grandmother; Hypertension in her father; No Known Problems in her mother and sister. The patients home medications have been reviewed.     Allergies: Bactrim [sulfamethoxazole-trimethoprim] and Sulfa antibiotics    -------------------------------------------------- RESULTS -------------------------------------------------  All laboratory and radiology results have been personally reviewed by myself   LABS:  Results for orders placed or performed during the hospital encounter of 07/24/20   CBC Auto Differential   Result Value Ref Range    WBC 6.7 4.5 - 11.5 E9/L    RBC 4.82 3.50 - 5.50 E12/L    Hemoglobin 14.6 11.5 - 15.5 g/dL    Hematocrit 44.3 34.0 - 48.0 %    MCV 91.9 80.0 - 99.9 fL    MCH 30.3 26.0 - 35.0 pg    MCHC 33.0 32.0 - 34.5 %    RDW 13.4 11.5 - 15.0 fL    Platelets 880 (L) 764 - 450 E9/L    MPV 13.4 (H) 7.0 - 12.0 fL    Neutrophils % 81.0 (H) Result Value Ref Range    Occult Blood Screening       Occult Blood test result is negative. *  *  Normal range: negative     Lactic acid, plasma   Result Value Ref Range    Lactic Acid 1.2 0.5 - 2.2 mmol/L   Procalcitonin   Result Value Ref Range    Procalcitonin 0.15 (H) 0.00 - 0.08 ng/mL   Basic Metabolic Panel w/ Reflex to MG   Result Value Ref Range    Sodium 137 132 - 146 mmol/L    Potassium reflex Magnesium 3.8 3.5 - 5.0 mmol/L    Chloride 107 98 - 107 mmol/L    CO2 16 (L) 22 - 29 mmol/L    Anion Gap 14 7 - 16 mmol/L    Glucose 81 74 - 99 mg/dL    BUN 7 6 - 20 mg/dL    CREATININE 0.7 0.5 - 1.0 mg/dL    GFR Non-African American >60 >=60 mL/min/1.73    GFR African American >60     Calcium 8.5 (L) 8.6 - 10.2 mg/dL   CBC auto differential   Result Value Ref Range    WBC 7.3 4.5 - 11.5 E9/L    RBC 4.46 3.50 - 5.50 E12/L    Hemoglobin 13.6 11.5 - 15.5 g/dL    Hematocrit 41.0 34.0 - 48.0 %    MCV 91.9 80.0 - 99.9 fL    MCH 30.5 26.0 - 35.0 pg    MCHC 33.2 32.0 - 34.5 %    RDW 13.5 11.5 - 15.0 fL    Platelets 430 (L) 968 - 450 E9/L    MPV 13.1 (H) 7.0 - 12.0 fL    Neutrophils % 77.5 43.0 - 80.0 %    Immature Granulocytes % 0.4 0.0 - 5.0 %    Lymphocytes % 8.8 (L) 20.0 - 42.0 %    Monocytes % 10.8 2.0 - 12.0 %    Eosinophils % 2.1 0.0 - 6.0 %    Basophils % 0.4 0.0 - 2.0 %    Neutrophils Absolute 5.62 1.80 - 7.30 E9/L    Immature Granulocytes # 0.03 E9/L    Lymphocytes Absolute 0.64 (L) 1.50 - 4.00 E9/L    Monocytes Absolute 0.78 0.10 - 0.95 E9/L    Eosinophils Absolute 0.15 0.05 - 0.50 E9/L    Basophils Absolute 0.03 0.00 - 0.20 E9/L       RADIOLOGY:  Interpreted by Radiologist.  CT ABDOMEN PELVIS WO CONTRAST   Final Result      1. Diffuse small and large bowel enteritis with multiple fluid-filled   bowel loops showing minimal distention and no focal point of   obstruction. Consistent with infectious etiology. 2. Minimal free fluid in the pelvis.    3. Cholelithiasis with a contracted gallbladder, no obvious   cholecystitis.                   ------------------------- NURSING NOTES AND VITALS REVIEWED ---------------------------   The nursing notes within the ED encounter and vital signs as below have been reviewed. BP (!) 94/51   Pulse 75   Temp 97.9 °F (36.6 °C) (Infrared)   Resp 16   Ht 5' 2\" (1.575 m)   Wt 129 lb (58.5 kg)   LMP 07/03/2020   SpO2 98%   BMI 23.59 kg/m²   Oxygen Saturation Interpretation: Normal      ---------------------------------------------------PHYSICAL EXAM----------------------------------  Constitutional/General: Alert and oriented x3, well appearing, non toxic in NAD  Head: NC/AT  Eyes: PERRL, EOMI  Mouth: Oropharynx clear, handling secretions, no trismus  Neck: Supple, full ROM, no meningeal signs  Pulmonary: Lungs clear to auscultation bilaterally, no wheezes, rales, or rhonchi. Not in respiratory distress  Cardiovascular:  Regular rate and rhythm, no murmurs, gallops, or rubs. 2+ distal pulses  Abdomen: Soft, non tender, non distended,   Extremities: Moves all extremities x 4.  Warm and well perfused  Skin: warm and dry without rash  Neurologic: GCS 15,  Psych: Normal Affect      ------------------------------ ED COURSE/MEDICAL DECISION MAKING----------------------  Medications   sodium chloride flush 0.9 % injection 10 mL (10 mLs Intravenous Given 7/24/20 3752)   sodium chloride flush 0.9 % injection 10 mL (has no administration in time range)   acetaminophen (TYLENOL) tablet 650 mg (has no administration in time range)     Or   acetaminophen (TYLENOL) suppository 650 mg (has no administration in time range)   polyethylene glycol (GLYCOLAX) packet 17 g (has no administration in time range)   promethazine (PHENERGAN) tablet 12.5 mg (12.5 mg Oral Given 7/25/20 1952)     Or   ondansetron (ZOFRAN) injection 4 mg ( Intravenous See Alternative 7/25/20 2262)   enoxaparin (LOVENOX) injection 40 mg (40 mg Subcutaneous Not Given 7/24/20 3283)   0.9 % sodium chloride bolus (0 mLs Intravenous Stopped 7/24/20 9345)   0.9 % sodium chloride bolus (0 mLs Intravenous Stopped 7/24/20 1225)   potassium chloride (KLOR-CON M) extended release tablet 20 mEq (20 mEq Oral Given 7/24/20 1029)         Medical Decision Making:    Labs,iv fluids    Counseling: The emergency provider has spoken with the patient and discussed todays results, in addition to providing specific details for the plan of care and counseling regarding the diagnosis and prognosis. Questions are answered at this time and they are agreeable with the plan.      --------------------------------- IMPRESSION AND DISPOSITION ---------------------------------    IMPRESSION  No diagnosis found.     DISPOSITION  Disposition: Admit to med/surg floor  Patient condition is stable                  Morris Shone, MD  07/25/20 3402

## 2020-07-25 LAB
ANION GAP SERPL CALCULATED.3IONS-SCNC: 14 MMOL/L (ref 7–16)
BASOPHILS ABSOLUTE: 0.03 E9/L (ref 0–0.2)
BASOPHILS RELATIVE PERCENT: 0.4 % (ref 0–2)
BUN BLDV-MCNC: 7 MG/DL (ref 6–20)
C DIFF TOXIN/ANTIGEN: NORMAL
CALCIUM SERPL-MCNC: 8.5 MG/DL (ref 8.6–10.2)
CHLORIDE BLD-SCNC: 107 MMOL/L (ref 98–107)
CO2: 16 MMOL/L (ref 22–29)
CREAT SERPL-MCNC: 0.7 MG/DL (ref 0.5–1)
EOSINOPHILS ABSOLUTE: 0.15 E9/L (ref 0.05–0.5)
EOSINOPHILS RELATIVE PERCENT: 2.1 % (ref 0–6)
GFR AFRICAN AMERICAN: >60
GFR NON-AFRICAN AMERICAN: >60 ML/MIN/1.73
GLUCOSE BLD-MCNC: 81 MG/DL (ref 74–99)
HCT VFR BLD CALC: 41 % (ref 34–48)
HEMOGLOBIN: 13.6 G/DL (ref 11.5–15.5)
IMMATURE GRANULOCYTES #: 0.03 E9/L
IMMATURE GRANULOCYTES %: 0.4 % (ref 0–5)
LYMPHOCYTES ABSOLUTE: 0.64 E9/L (ref 1.5–4)
LYMPHOCYTES RELATIVE PERCENT: 8.8 % (ref 20–42)
MCH RBC QN AUTO: 30.5 PG (ref 26–35)
MCHC RBC AUTO-ENTMCNC: 33.2 % (ref 32–34.5)
MCV RBC AUTO: 91.9 FL (ref 80–99.9)
MONOCYTES ABSOLUTE: 0.78 E9/L (ref 0.1–0.95)
MONOCYTES RELATIVE PERCENT: 10.8 % (ref 2–12)
NEUTROPHILS ABSOLUTE: 5.62 E9/L (ref 1.8–7.3)
NEUTROPHILS RELATIVE PERCENT: 77.5 % (ref 43–80)
PDW BLD-RTO: 13.5 FL (ref 11.5–15)
PLATELET # BLD: 110 E9/L (ref 130–450)
PMV BLD AUTO: 13.1 FL (ref 7–12)
POTASSIUM REFLEX MAGNESIUM: 3.8 MMOL/L (ref 3.5–5)
RBC # BLD: 4.46 E12/L (ref 3.5–5.5)
ROTAVIRUS ANTIGEN: NORMAL
SODIUM BLD-SCNC: 137 MMOL/L (ref 132–146)
WBC # BLD: 7.3 E9/L (ref 4.5–11.5)
WHITE BLOOD CELLS (WBC), STOOL: NORMAL

## 2020-07-25 PROCEDURE — 85025 COMPLETE CBC W/AUTO DIFF WBC: CPT

## 2020-07-25 PROCEDURE — 80048 BASIC METABOLIC PNL TOTAL CA: CPT

## 2020-07-25 PROCEDURE — 2580000003 HC RX 258: Performed by: INTERNAL MEDICINE

## 2020-07-25 PROCEDURE — 6370000000 HC RX 637 (ALT 250 FOR IP): Performed by: INTERNAL MEDICINE

## 2020-07-25 PROCEDURE — APPSS30 APP SPLIT SHARED TIME 16-30 MINUTES: Performed by: PHYSICIAN ASSISTANT

## 2020-07-25 PROCEDURE — 99233 SBSQ HOSP IP/OBS HIGH 50: CPT | Performed by: INTERNAL MEDICINE

## 2020-07-25 PROCEDURE — G0378 HOSPITAL OBSERVATION PER HR: HCPCS

## 2020-07-25 PROCEDURE — 36415 COLL VENOUS BLD VENIPUNCTURE: CPT

## 2020-07-25 PROCEDURE — 2580000003 HC RX 258: Performed by: PHYSICIAN ASSISTANT

## 2020-07-25 RX ORDER — SODIUM CHLORIDE 9 MG/ML
INJECTION, SOLUTION INTRAVENOUS CONTINUOUS
Status: DISCONTINUED | OUTPATIENT
Start: 2020-07-25 | End: 2020-07-27 | Stop reason: HOSPADM

## 2020-07-25 RX ORDER — LOPERAMIDE HYDROCHLORIDE 2 MG/1
2 CAPSULE ORAL 4 TIMES DAILY PRN
Status: DISCONTINUED | OUTPATIENT
Start: 2020-07-25 | End: 2020-07-27 | Stop reason: HOSPADM

## 2020-07-25 RX ADMIN — PROMETHAZINE HYDROCHLORIDE 12.5 MG: 25 TABLET ORAL at 17:36

## 2020-07-25 RX ADMIN — LOPERAMIDE HYDROCHLORIDE 2 MG: 2 CAPSULE ORAL at 15:06

## 2020-07-25 RX ADMIN — SODIUM CHLORIDE: 9 INJECTION, SOLUTION INTRAVENOUS at 13:42

## 2020-07-25 RX ADMIN — PROMETHAZINE HYDROCHLORIDE 12.5 MG: 25 TABLET ORAL at 11:04

## 2020-07-25 RX ADMIN — SODIUM CHLORIDE, PRESERVATIVE FREE 10 ML: 5 INJECTION INTRAVENOUS at 08:03

## 2020-07-25 RX ADMIN — PROMETHAZINE HYDROCHLORIDE 12.5 MG: 25 TABLET ORAL at 04:42

## 2020-07-25 ASSESSMENT — PAIN SCALES - GENERAL
PAINLEVEL_OUTOF10: 0
PAINLEVEL_OUTOF10: 0

## 2020-07-25 NOTE — PROGRESS NOTES
Addendum: I have personally participated in the history, exam, medical decision making with Moo JUAREZ on the date of service, I have personally reviewed imaging and lab data as well as EKG and I agree with all of the pertinent clinical information unless otherwise noted. I have also reviewed and agree with the past medical, family, and social history unless otherwise noted. Please link this note to the NP/PA note of the same date 7/25/2020  Patient was nauseous and vomited 3 times a day, still having significant  Number of watery bowel movements. PHYSICAL EXAM:  Vitals:  BP (!) 94/51   Pulse 75   Temp 97.9 °F (36.6 °C) (Infrared)   Resp 16   Ht 5' 2\" (1.575 m)   Wt 129 lb (58.5 kg)   LMP 07/03/2020   SpO2 98%   BMI 23.59 kg/m²   Lungs are clear to auscultation bilaterally no crackles no wheezing. Heart S1-S2. Abdomen soft mild diffuse tenderness nondistended positive bowel sounds. Extremities no peripheral edema. Impression:  Active Problems:    Gastroenteritis  Resolved Problems:    * No resolved hospital problems. *      My findings/plan include:  1. Acute enterocolitis, questioning infectious etiology, patient does not have leukocytosis, most likely infectious at this point given negative C. difficile no stool leukocyte, negative rotavirus and nothing growing on stool cultures, this might be either viral infection or inflammatory bowel disease, I will start patient on Imodium and will consult GI before starting steroids. 2.  Nausea vomiting and diarrhea, due to the above, start patient on symptomatic treatment with antiemetics, IV fluids, lactic acid was normal.    Start Imodium  3. Hypokalemia, due to nausea vomiting and diarrhea, potassium 3.3, will replace. 4.  Cholelithiasis, no signs of acute cholecystitis, I do not think this is related to her presentation.   Although she has large cholelithiasis, probably patient can follow-up as an outpatient for possible cholecystectomy down the road. NOTE: This report was transcribed using voice recognition software. Every effort was made to ensure accuracy; however, inadvertent computerized transcription errors may be present.   Electronically signed by Joshua Rush MD on 7/25/2020 at 7:52 AM

## 2020-07-25 NOTE — PROGRESS NOTES
[facility] AT [facility]   Progress Note    Admitting Date and Time: 7/24/2020  6:32 AM  Admit Dx: Gastroenteritis [K52.9]  Gastroenteritis [K52.9]    Subjective:    Patient was admitted with Gastroenteritis [K52.9]  Gastroenteritis [K52.9]. Patient still having diarrhea today. She reports she is still having some nausea and eating and drinking very little. Per RN: No acute events overnight    ROS: denies fever, chills, cp, sob, n/v, HA unless stated above.  sodium chloride flush  10 mL Intravenous 2 times per day    enoxaparin  40 mg Subcutaneous Daily     sodium chloride flush, 10 mL, PRN  acetaminophen, 650 mg, Q6H PRN    Or  acetaminophen, 650 mg, Q6H PRN  polyethylene glycol, 17 g, Daily PRN  promethazine, 12.5 mg, Q6H PRN    Or  ondansetron, 4 mg, Q6H PRN         Objective:    BP (!) 102/52   Pulse 67   Temp 97.8 °F (36.6 °C) (Infrared)   Resp 18   Ht 5' 2\" (1.575 m)   Wt 129 lb (58.5 kg)   LMP 07/03/2020   SpO2 97%   BMI 23.59 kg/m²   General Appearance: in no acute distress and alert  Skin: warm and dry, no rash or erythema  Pulmonary/Chest: clear to auscultation bilaterally- no wheezes, rales or rhonchi, normal air movement, no respiratory distress  Cardiovascular: normal rate, regular rhythm, normal S1 and S2, no murmurs, no gallops and no JVD  Abdomen: soft, non-tender, non-distended, normal bowel sounds, no masses or organomegaly  Extremities: no cyanosis, no clubbing and no edema      Recent Labs     07/24/20  0717 07/25/20  0530    137   K 3.3* 3.8    107   CO2 22 16*   BUN 10 7   CREATININE 0.8 0.7   GLUCOSE 83 81   CALCIUM 9.1 8.5*       Recent Labs     07/24/20  0717 07/25/20  0530   WBC 6.7 7.3   RBC 4.82 4.46   HGB 14.6 13.6   HCT 44.3 41.0   MCV 91.9 91.9   MCH 30.3 30.5   MCHC 33.0 33.2   RDW 13.4 13.5   * 110*   MPV 13.4* 13.1*       Radiology:   CT ABDOMEN PELVIS WO CONTRAST   Final Result      1.  Diffuse small and large bowel enteritis with multiple fluid-filled   bowel loops showing minimal distention and no focal point of   obstruction. Consistent with infectious etiology. 2. Minimal free fluid in the pelvis. 3. Cholelithiasis with a contracted gallbladder, no obvious   cholecystitis. Assessment:    Active Problems:    Gastroenteritis  Resolved Problems:    * No resolved hospital problems. *      Plan:    1. Acute enterocolitis with nausea, vomiting and diarrhea  -C. Difficile negative. Rotavirus negative, still having significant diarrhea.  -Started IV fluids.  -Considering GI consult    2. Hypokalemia  -  K 3.3 on admission, replaced now 3.8    3. Cholelithiasis  -No acute cholecystitis noted. Recommend follow-up as outpatient. 4.  Hypotension  -Most likely secondary to volume contraction, IV fluids started. We will continue to monitor.         Electronically signed by Mehnaz Levy PA-C on 7/25/2020 at 9:16 AM

## 2020-07-25 NOTE — PROGRESS NOTES
[facility] Quality Flow/Interdisciplinary Rounds Progress Note        Quality Flow Rounds held on July 25, 2020    Disciplines Attending:  Bedside Nurse, ,  and Nursing Unit Leadership    Jocelyn Amaro was admitted on 7/24/2020  6:32 AM    Anticipated Discharge Date:  Expected Discharge Date: 07/27/20    Disposition:    Eron Score:  Eron Scale Score: 20    Readmission Risk              Risk of Unplanned Readmission:        0           Discussed patient goal for the day, patient clinical progression, and barriers to discharge. The following Goal(s) of the Day/Commitment(s) have been identified:  hydrate, await stool results.        Jaimee Villela  July 25, 2020

## 2020-07-26 PROBLEM — K52.9 IBD (INFLAMMATORY BOWEL DISEASE): Status: ACTIVE | Noted: 2020-07-26

## 2020-07-26 LAB
ALBUMIN SERPL-MCNC: 3.8 G/DL (ref 3.5–5.2)
ALP BLD-CCNC: 44 U/L (ref 35–104)
ALT SERPL-CCNC: 83 U/L (ref 0–32)
ANION GAP SERPL CALCULATED.3IONS-SCNC: 18 MMOL/L (ref 7–16)
AST SERPL-CCNC: 58 U/L (ref 0–31)
BASOPHILS ABSOLUTE: 0.01 E9/L (ref 0–0.2)
BASOPHILS RELATIVE PERCENT: 0.1 % (ref 0–2)
BILIRUB SERPL-MCNC: 0.6 MG/DL (ref 0–1.2)
BUN BLDV-MCNC: 5 MG/DL (ref 6–20)
C-REACTIVE PROTEIN: 0.7 MG/DL (ref 0–0.4)
CALCIUM SERPL-MCNC: 8.3 MG/DL (ref 8.6–10.2)
CHLORIDE BLD-SCNC: 109 MMOL/L (ref 98–107)
CO2: 12 MMOL/L (ref 22–29)
CREAT SERPL-MCNC: 0.6 MG/DL (ref 0.5–1)
CULTURE, STOOL: NORMAL
EOSINOPHILS ABSOLUTE: 0.06 E9/L (ref 0.05–0.5)
EOSINOPHILS RELATIVE PERCENT: 0.9 % (ref 0–6)
GFR AFRICAN AMERICAN: >60
GFR NON-AFRICAN AMERICAN: >60 ML/MIN/1.73
GLUCOSE BLD-MCNC: 67 MG/DL (ref 74–99)
HCT VFR BLD CALC: 42.5 % (ref 34–48)
HEMOGLOBIN: 14.2 G/DL (ref 11.5–15.5)
IMMATURE GRANULOCYTES #: 0.02 E9/L
IMMATURE GRANULOCYTES %: 0.3 % (ref 0–5)
LYMPHOCYTES ABSOLUTE: 0.94 E9/L (ref 1.5–4)
LYMPHOCYTES RELATIVE PERCENT: 13.5 % (ref 20–42)
MCH RBC QN AUTO: 30.8 PG (ref 26–35)
MCHC RBC AUTO-ENTMCNC: 33.4 % (ref 32–34.5)
MCV RBC AUTO: 92.2 FL (ref 80–99.9)
MONOCYTES ABSOLUTE: 0.73 E9/L (ref 0.1–0.95)
MONOCYTES RELATIVE PERCENT: 10.5 % (ref 2–12)
NEUTROPHILS ABSOLUTE: 5.2 E9/L (ref 1.8–7.3)
NEUTROPHILS RELATIVE PERCENT: 74.7 % (ref 43–80)
PDW BLD-RTO: 13.6 FL (ref 11.5–15)
PLATELET # BLD: 121 E9/L (ref 130–450)
PMV BLD AUTO: 12.8 FL (ref 7–12)
POTASSIUM SERPL-SCNC: 4.2 MMOL/L (ref 3.5–5)
RBC # BLD: 4.61 E12/L (ref 3.5–5.5)
SEDIMENTATION RATE, ERYTHROCYTE: 3 MM/HR (ref 0–20)
SODIUM BLD-SCNC: 139 MMOL/L (ref 132–146)
TOTAL PROTEIN: 6.1 G/DL (ref 6.4–8.3)
WBC # BLD: 7 E9/L (ref 4.5–11.5)

## 2020-07-26 PROCEDURE — 85651 RBC SED RATE NONAUTOMATED: CPT

## 2020-07-26 PROCEDURE — 6360000002 HC RX W HCPCS: Performed by: CLINICAL NURSE SPECIALIST

## 2020-07-26 PROCEDURE — 6370000000 HC RX 637 (ALT 250 FOR IP): Performed by: CLINICAL NURSE SPECIALIST

## 2020-07-26 PROCEDURE — 99233 SBSQ HOSP IP/OBS HIGH 50: CPT | Performed by: INTERNAL MEDICINE

## 2020-07-26 PROCEDURE — 36415 COLL VENOUS BLD VENIPUNCTURE: CPT

## 2020-07-26 PROCEDURE — 85025 COMPLETE CBC W/AUTO DIFF WBC: CPT

## 2020-07-26 PROCEDURE — 86671 FUNGUS NES ANTIBODY: CPT

## 2020-07-26 PROCEDURE — 86140 C-REACTIVE PROTEIN: CPT

## 2020-07-26 PROCEDURE — 96375 TX/PRO/DX INJ NEW DRUG ADDON: CPT

## 2020-07-26 PROCEDURE — 1200000000 HC SEMI PRIVATE

## 2020-07-26 PROCEDURE — APPSS30 APP SPLIT SHARED TIME 16-30 MINUTES: Performed by: PHYSICIAN ASSISTANT

## 2020-07-26 PROCEDURE — 2580000003 HC RX 258: Performed by: PHYSICIAN ASSISTANT

## 2020-07-26 PROCEDURE — 6360000002 HC RX W HCPCS: Performed by: INTERNAL MEDICINE

## 2020-07-26 PROCEDURE — 83516 IMMUNOASSAY NONANTIBODY: CPT

## 2020-07-26 PROCEDURE — 80053 COMPREHEN METABOLIC PANEL: CPT

## 2020-07-26 PROCEDURE — 83993 ASSAY FOR CALPROTECTIN FECAL: CPT

## 2020-07-26 PROCEDURE — 6370000000 HC RX 637 (ALT 250 FOR IP): Performed by: PHYSICIAN ASSISTANT

## 2020-07-26 PROCEDURE — 96374 THER/PROPH/DIAG INJ IV PUSH: CPT

## 2020-07-26 RX ORDER — PANTOPRAZOLE SODIUM 40 MG/1
40 TABLET, DELAYED RELEASE ORAL
Status: DISCONTINUED | OUTPATIENT
Start: 2020-07-26 | End: 2020-07-27 | Stop reason: HOSPADM

## 2020-07-26 RX ORDER — METHYLPREDNISOLONE SODIUM SUCCINATE 40 MG/ML
30 INJECTION, POWDER, LYOPHILIZED, FOR SOLUTION INTRAMUSCULAR; INTRAVENOUS EVERY 8 HOURS
Status: DISCONTINUED | OUTPATIENT
Start: 2020-07-26 | End: 2020-07-27

## 2020-07-26 RX ORDER — DICYCLOMINE HYDROCHLORIDE 10 MG/1
10 CAPSULE ORAL 2 TIMES DAILY
Status: DISCONTINUED | OUTPATIENT
Start: 2020-07-26 | End: 2020-07-27

## 2020-07-26 RX ADMIN — SODIUM CHLORIDE: 9 INJECTION, SOLUTION INTRAVENOUS at 04:11

## 2020-07-26 RX ADMIN — DICYCLOMINE HYDROCHLORIDE 10 MG: 10 CAPSULE ORAL at 20:51

## 2020-07-26 RX ADMIN — METHYLPREDNISOLONE SODIUM SUCCINATE 30 MG: 40 INJECTION, POWDER, LYOPHILIZED, FOR SOLUTION INTRAMUSCULAR; INTRAVENOUS at 09:32

## 2020-07-26 RX ADMIN — SODIUM CHLORIDE: 9 INJECTION, SOLUTION INTRAVENOUS at 20:58

## 2020-07-26 RX ADMIN — PANTOPRAZOLE SODIUM 40 MG: 40 TABLET, DELAYED RELEASE ORAL at 10:17

## 2020-07-26 RX ADMIN — METHYLPREDNISOLONE SODIUM SUCCINATE 30 MG: 40 INJECTION, POWDER, LYOPHILIZED, FOR SOLUTION INTRAMUSCULAR; INTRAVENOUS at 16:41

## 2020-07-26 RX ADMIN — ONDANSETRON 4 MG: 2 INJECTION INTRAMUSCULAR; INTRAVENOUS at 04:11

## 2020-07-26 ASSESSMENT — PAIN SCALES - GENERAL
PAINLEVEL_OUTOF10: 0
PAINLEVEL_OUTOF10: 0

## 2020-07-26 NOTE — PROGRESS NOTES
4.2  4.  Cholelithiasis, no signs of acute cholecystitis, I do not think this is related to her presentation. Although she has large cholelithiasis, probably patient can follow-up as an outpatient for possible cholecystectomy down the road. NOTE: This report was transcribed using voice recognition software. Every effort was made to ensure accuracy; however, inadvertent computerized transcription errors may be present.   Electronically signed by Fina Correa MD on 7/26/2020 at 2:48 PM

## 2020-07-26 NOTE — CONSULTS
Gastroenterology Consult Note   [facility] with Filipe Ayala M.D. Consult Note        Date of Service: 7/26/2020  Reason for Consult: Abd pain and diarrhea, ? IBD   Requesting Physician: Dr Jose David Flores: Nausea, vomiting, diarrhea, and abdominal pain    History Obtained From:  patient, electronic medical record    HISTORY OF PRESENT ILLNESS:       Amol Miller is a 31 y.o. female with no significant past medical history admitted via ED for diarrhea associated with nausea and vomiting. Pt reports when she was in college she was diagnosed with IBS by [facility] gastroenterology in [place]. She states that her bowels have never been regular, she usually has constipation with bowel movements every 2-3 days, brown in color with occasional bouts of diarrhea with abdominal cramping pre-defecation that resolves post defecation. She states she was to have a colonoscopy 2 years ago with the GI group however she was pregnant and unable to have it done. She states she was also diagnosed with decreased function of her gallbladder as well as gallstones so when she would have abdominal issues she \"chalked it up to that. \"  Patient states Tuesday she was not feeling well throughout the day she had some abdominal discomfort and \"just was not feeling great lately. I have not really been eating for several days just because I have not felt great. I woke up at 1 AM Tuesday night, early Wednesday morning and had vomiting, first what I had my stomach, then just yellow stomach acid. \"  Patient states she continued to have frequent nausea and vomiting and was not feeling well so after 14 hours of this she went to urgent care and received IV fluids which helped her to feel a little bit better. She then started to have diarrhea stating she had at least 20 bouts in 24 hours with nocturnal diarrhea of watery brown initially, then yellow diarrhea so she came in for evaluation.   She reports chills prior to admission pre-defecation as well as pre-emesis stating they resolved post defecation or after vomiting. She reports stomach cramping entire low abdomen 6/10 pre-defecation which resolved post defecation. Patient states she had EGD about 8 years ago which reportedly showed hiatal hernia. She states she has celiac disease in her family but not personally. Patient denies recent antibiotics. She states she was eating bagged lettuce but had not been eating out prior to admission. Patient reports weight loss 20# since September, states it was intentional.  Patient denies fever, hematemesis, hematochezia, or melena. Admission labs: Potassium 3.3, otherwise CMP WNL; MPV 13.4; platelet 418; neutrophil 81%; lymphs 8.7%; absolute lymphs 0.58, otherwise CBC with differential WNL; stools for C. difficile and cultures negative . CT Abd/Pelvis Wo Contrast - 1. Diffuse small and large bowel enteritis with multiple fluid-filled bowel loops showing minimal distention and no focal point of obstruction. Consistent with infectious etiology. 2. Minimal free fluid in the pelvis. 3. Cholelithiasis with a contracted gallbladder, no obvious cholecystitis. Consultation for Abd pain and diarrhea, ? IBD . Currently, pt reports 6 bouts of watery yellow diarrhea since midnight. Patient states she has not eaten since Tuesday. She reports no nausea at this time. Patient reports stomach cramps once last night, none today. Labs today: Pending.     Past Medical History:        Diagnosis Date    Abnormal Pap smear of cervix     hpv leep      Past Surgical History:        Procedure Laterality Date    BREAST REDUCTION SURGERY  2009    BREAST REDUCTION SURGERY  2009    LEEP  2012    WISDOM TOOTH EXTRACTION  2014     Current Medications:    Current Facility-Administered Medications: methylPREDNISolone sodium (SOLU-MEDROL) injection 30 mg, 30 mg, Intravenous, Q8H  0.9 % sodium chloride infusion, , Intravenous, Continuous  loperamide (IMODIUM) capsule 2 mg, 2 mg, Oral, 4x Daily PRN  sodium chloride flush 0.9 % injection 10 mL, 10 mL, Intravenous, 2 times per day  sodium chloride flush 0.9 % injection 10 mL, 10 mL, Intravenous, PRN  acetaminophen (TYLENOL) tablet 650 mg, 650 mg, Oral, Q6H PRN **OR** acetaminophen (TYLENOL) suppository 650 mg, 650 mg, Rectal, Q6H PRN  polyethylene glycol (GLYCOLAX) packet 17 g, 17 g, Oral, Daily PRN  promethazine (PHENERGAN) tablet 12.5 mg, 12.5 mg, Oral, Q6H PRN **OR** ondansetron (ZOFRAN) injection 4 mg, 4 mg, Intravenous, Q6H PRN  enoxaparin (LOVENOX) injection 40 mg, 40 mg, Subcutaneous, Daily    Allergies:  Bactrim [sulfamethoxazole-trimethoprim] and Sulfa antibiotics    Social History:    Tobacco:  Pt denies. Alcohol:  Pt reports 1-2 drinks a month at the most.  Illicit Drugs: Pt reports he smoked marijuana a few times when she was younger, none since. Family History:   Family History   Problem Relation Age of Onset    No Known Problems Mother     Hypertension Father     No Known Problems Sister     Cancer Paternal Grandmother     Cancer Paternal Grandfather    Mother living and healthy. Father living, he has hypertension and is overweight. He also has PVD. 1 sister living and healthy. 2 children living and healthy. Paternal grandparents both had cancer of unknown site. REVIEW OF SYSTEMS:    Aside from what was mentioned in the PMH and HPI, essentially unremarkable, all others negative. PHYSICAL EXAM:      Vitals:    BP (!) 105/52   Pulse 80   Temp 97.8 °F (36.6 °C) (Oral)   Resp 18   Ht 5' 2\" (1.575 m)   Wt 129 lb (58.5 kg)   LMP 07/03/2020   SpO2 96%   BMI 23.59 kg/m²       CONSTITUTIONAL:  awake, alert, cooperative, pale, thin, fatigued appearing, laying in bed, and appears stated age  EYES:  pupils equal, round and reactive to light, sclera anicteric and conjunctiva normal  ENT:  normocephalic, oral pharynx with moist mucous membranes  LUNGS:  clear to auscultation bilaterally.   CARDIOVASCULAR: regular rate and rhythm, no murmur noted; 2+ pulses; no edema  ABDOMEN: hyperactive bowel sounds, softly distended, non-tender, no masses palpated, no hepatosplenomegally  MUSCULOSKELETAL:  full range of motion noted  motor strength is 5 out of 5 all extremities bilaterally  NEUROLOGIC:  Mental Status Exam:  Level of Alertness:   awake  Orientation:   person, place, time  Motor Exam:  Motor exam is symmetrical 5 out of 5 all extremities bilaterally  SKIN:  normal skin color, texture, turgor    DATA:    CBC with Differential:    Lab Results   Component Value Date    WBC 7.3 2020    RBC 4.46 2020    HGB 13.6 2020    HCT 41.0 2020     2020    MCV 91.9 2020    MCH 30.5 2020    MCHC 33.2 2020    RDW 13.5 2020    LYMPHOPCT 8.8 2020    MONOPCT 10.8 2020    BASOPCT 0.4 2020    MONOSABS 0.78 2020    LYMPHSABS 0.64 2020    EOSABS 0.15 2020    BASOSABS 0.03 2020     CMP:    Lab Results   Component Value Date     2020    K 3.8 2020     2020    CO2 16 2020    BUN 7 2020    CREATININE 0.7 2020    GFRAA >60 2020    LABGLOM >60 2020    GLUCOSE 81 2020    PROT 7.3 2020    LABALBU 4.3 2020    CALCIUM 8.5 2020    BILITOT 0.8 2020    ALKPHOS 45 2020    AST 29 2020    ALT 15 2020     Hepatic Function Panel:    Lab Results   Component Value Date    ALKPHOS 45 2020    ALT 15 2020    AST 29 2020    PROT 7.3 2020    BILITOT 0.8 2020    LABALBU 4.3 2020     TSH:    Lab Results   Component Value Date    TSH 0.370 2018       Ct Abdomen Pelvis Wo Contrast    Result Date: 2020  Patient MRN:  [id number] : 1990 Age: 31 years Gender: Female Order Date:  2020 7:30 AM EXAM: CT ABDOMEN PELVIS WO CONTRAST INDICATION:  pain .  Patient complaining of diffuse abdomen pains, cramps and diarrhea for 3 days. TECHNIQUE: Axial images from the lung bases to the pubic symphysis without any IV or oral contrast. Axial sagittal and coronal 2-D reconstructions with soft tissue windows. Axial lung windows. Dose reduction techniques with automated exposure control. Contrast is none. Dose is total .56 MG Y CM. COMPARISON: None FINDINGS:  Lung bases: No infiltrate or effusion. Heart size normal. No hiatal hernia. ABDOMEN: The gallbladder is very contracted. Cholelithiasis with a 1.7 cm densely calcified stone in the proximal gallbladder and 8 mm calcified stone in the gallbladder fundus. The gallbladder walls are not edematous. The common bile duct measures estimated 4 mm. The liver has normal size with length 15.8 cm. 2 small cysts are seen at the superior margin of the lateral segment left hepatic lobe measuring 5 mm and 4 mm. Bile ducts are not dilated. No ascites. Negative noncontrast pancreas, spleen, adrenal glands, kidneys, aorta, IVC. Negative for a renal or ureter stone or hydronephrosis. No perinephric stranding. No adenopathy. Stomach contains a mild amount of gas, without obstruction or wall thickening. The small bowel loops are diffusely mildly dilated and fluid-filled all the way to the colon without obstruction. Mild mesenteric lymphadenopathy with the largest mesenteric lymph node 1.4 cm. Pelvis: The colon is diffusely mildly dilated with fluid. Also fluid in the rectum. No obvious wall thickening or obstruction. Findings are consistent with a diffuse enteritis. Trace amount of free fluid in the posterior right pelvis. The uterus is present, anteverted in position, no evidence of pathology or fibroids. Bilateral ovaries are present. Mild follicular changes noted. No dominant cyst. The urinary bladder was essentially empty at the time of exam. No stones or wall thickening. No ventral hernias. Bones are grossly normal. No compression fracture.      1. Diffuse small and large bowel enteritis with multiple fluid-filled bowel loops showing minimal distention and no focal point of obstruction. Consistent with infectious etiology. 2. Minimal free fluid in the pelvis. 3. Cholelithiasis with a contracted gallbladder, no obvious cholecystitis. IMPRESSION:  · Enterocolitis, stools negative for infectious etiology, will R/O IBD  · Diarrhea  · Abdominal cramping, diffuse  · Nausea and vomiting  · Thrombocytopenia  · Hypokalemia-defer to PCP    RECOMMENDATIONS:    · CRP and sed rate today  · Stool fecal calprotectin, parasites, and other cultures as ordered  · IBD panel today  · IV fluids per PCP  · Solu-Medrol IV 30 mg every 8 hours as ordered  · Bentyl as ordered  · Monitor CBC, CMP daily  · Colonoscopy to be scheduled with Dr. Angela Pryor as outpatient once current colitis resolves  · Low fiber, lactose controlled diet  · Consider work-up for thrombocytopenia-defer to PCP  · Continue to monitor    Note: This report was completed utilizing computer voice recognition software. Every effort has been made to ensure accuracy, however; inadvertent computerized transcription errors may be present. Thank you very much for your consultation. We will follow closely with you.     Discussed with Dr. Dyan Holstein developed by Dr. Ike Lazar [id number], FNP-BC 7/26/2020 9:43 AM for Dr. Angela Pryor

## 2020-07-26 NOTE — PROGRESS NOTES
[facility] Quality Flow/Interdisciplinary Rounds Progress Note        Quality Flow Rounds held on July 26, 2020    Disciplines Attending:  Bedside Nurse and Nursing Unit Leadership    Freddy Austin was admitted on 7/24/2020  6:32 AM    Anticipated Discharge Date:  Expected Discharge Date: 07/27/20    Disposition:    Eron Score:  Eron Scale Score: 19    Readmission Risk              Risk of Unplanned Readmission:        0           Discussed patient goal for the day, patient clinical progression, and barriers to discharge. The following Goal(s) of the Day/Commitment(s) have been identified:  Await GI plan.       Amy Madera  July 26, 2020

## 2020-07-27 VITALS
TEMPERATURE: 97.6 F | OXYGEN SATURATION: 98 % | WEIGHT: 129 LBS | DIASTOLIC BLOOD PRESSURE: 69 MMHG | BODY MASS INDEX: 23.74 KG/M2 | HEIGHT: 62 IN | RESPIRATION RATE: 18 BRPM | SYSTOLIC BLOOD PRESSURE: 106 MMHG | HEART RATE: 73 BPM

## 2020-07-27 LAB
ALBUMIN SERPL-MCNC: 3.8 G/DL (ref 3.5–5.2)
ALP BLD-CCNC: 44 U/L (ref 35–104)
ALT SERPL-CCNC: 96 U/L (ref 0–32)
ANION GAP SERPL CALCULATED.3IONS-SCNC: 13 MMOL/L (ref 7–16)
AST SERPL-CCNC: 50 U/L (ref 0–31)
BASOPHILS ABSOLUTE: 0.02 E9/L (ref 0–0.2)
BASOPHILS RELATIVE PERCENT: 0.2 % (ref 0–2)
BILIRUB SERPL-MCNC: 0.5 MG/DL (ref 0–1.2)
BUN BLDV-MCNC: 5 MG/DL (ref 6–20)
CALCIUM SERPL-MCNC: 8.8 MG/DL (ref 8.6–10.2)
CHLORIDE BLD-SCNC: 107 MMOL/L (ref 98–107)
CO2: 17 MMOL/L (ref 22–29)
CREAT SERPL-MCNC: 0.5 MG/DL (ref 0.5–1)
EOSINOPHILS ABSOLUTE: 0.01 E9/L (ref 0.05–0.5)
EOSINOPHILS RELATIVE PERCENT: 0.1 % (ref 0–6)
GFR AFRICAN AMERICAN: >60
GFR NON-AFRICAN AMERICAN: >60 ML/MIN/1.73
GIARDIA ANTIGEN STOOL: NORMAL
GLUCOSE BLD-MCNC: 118 MG/DL (ref 74–99)
HCT VFR BLD CALC: 41.5 % (ref 34–48)
HEMOGLOBIN: 14.1 G/DL (ref 11.5–15.5)
IMMATURE GRANULOCYTES #: 0.04 E9/L
IMMATURE GRANULOCYTES %: 0.4 % (ref 0–5)
LYMPHOCYTES ABSOLUTE: 0.79 E9/L (ref 1.5–4)
LYMPHOCYTES RELATIVE PERCENT: 8.4 % (ref 20–42)
MCH RBC QN AUTO: 30.5 PG (ref 26–35)
MCHC RBC AUTO-ENTMCNC: 34 % (ref 32–34.5)
MCV RBC AUTO: 89.8 FL (ref 80–99.9)
MONOCYTES ABSOLUTE: 0.17 E9/L (ref 0.1–0.95)
MONOCYTES RELATIVE PERCENT: 1.8 % (ref 2–12)
NEUTROPHILS ABSOLUTE: 8.42 E9/L (ref 1.8–7.3)
NEUTROPHILS RELATIVE PERCENT: 89.1 % (ref 43–80)
ORGANISM: ABNORMAL
PDW BLD-RTO: 13.3 FL (ref 11.5–15)
PLATELET # BLD: 141 E9/L (ref 130–450)
PMV BLD AUTO: 13 FL (ref 7–12)
POTASSIUM SERPL-SCNC: 4.2 MMOL/L (ref 3.5–5)
RBC # BLD: 4.62 E12/L (ref 3.5–5.5)
SODIUM BLD-SCNC: 137 MMOL/L (ref 132–146)
TOTAL PROTEIN: 6.1 G/DL (ref 6.4–8.3)
WBC # BLD: 9.5 E9/L (ref 4.5–11.5)

## 2020-07-27 PROCEDURE — APPSS45 APP SPLIT SHARED TIME 31-45 MINUTES: Performed by: PHYSICIAN ASSISTANT

## 2020-07-27 PROCEDURE — 36415 COLL VENOUS BLD VENIPUNCTURE: CPT

## 2020-07-27 PROCEDURE — 99239 HOSP IP/OBS DSCHRG MGMT >30: CPT | Performed by: INTERNAL MEDICINE

## 2020-07-27 PROCEDURE — 6370000000 HC RX 637 (ALT 250 FOR IP): Performed by: PHYSICIAN ASSISTANT

## 2020-07-27 PROCEDURE — 2580000003 HC RX 258: Performed by: INTERNAL MEDICINE

## 2020-07-27 PROCEDURE — 2580000003 HC RX 258: Performed by: PHYSICIAN ASSISTANT

## 2020-07-27 PROCEDURE — 6360000002 HC RX W HCPCS: Performed by: CLINICAL NURSE SPECIALIST

## 2020-07-27 PROCEDURE — 6370000000 HC RX 637 (ALT 250 FOR IP): Performed by: CLINICAL NURSE SPECIALIST

## 2020-07-27 PROCEDURE — 85025 COMPLETE CBC W/AUTO DIFF WBC: CPT

## 2020-07-27 PROCEDURE — 80053 COMPREHEN METABOLIC PANEL: CPT

## 2020-07-27 RX ORDER — PREDNISONE 10 MG/1
TABLET ORAL
Qty: 80 TABLET | Refills: 0 | Status: SHIPPED | OUTPATIENT
Start: 2020-07-27 | End: 2020-09-03

## 2020-07-27 RX ORDER — PANTOPRAZOLE SODIUM 40 MG/1
40 TABLET, DELAYED RELEASE ORAL
Qty: 30 TABLET | Refills: 0 | Status: SHIPPED | OUTPATIENT
Start: 2020-07-28 | End: 2020-12-03 | Stop reason: ALTCHOICE

## 2020-07-27 RX ORDER — PREDNISONE 20 MG/1
20 TABLET ORAL
Status: DISCONTINUED | OUTPATIENT
Start: 2020-08-11 | End: 2020-07-27 | Stop reason: HOSPADM

## 2020-07-27 RX ORDER — PREDNISONE 1 MG/1
10 TABLET ORAL
Status: DISCONTINUED | OUTPATIENT
Start: 2020-08-25 | End: 2020-07-27 | Stop reason: HOSPADM

## 2020-07-27 RX ADMIN — SODIUM CHLORIDE: 9 INJECTION, SOLUTION INTRAVENOUS at 06:08

## 2020-07-27 RX ADMIN — METHYLPREDNISOLONE SODIUM SUCCINATE 30 MG: 40 INJECTION, POWDER, LYOPHILIZED, FOR SOLUTION INTRAMUSCULAR; INTRAVENOUS at 01:53

## 2020-07-27 RX ADMIN — DICYCLOMINE HYDROCHLORIDE 10 MG: 10 CAPSULE ORAL at 08:26

## 2020-07-27 RX ADMIN — PANTOPRAZOLE SODIUM 40 MG: 40 TABLET, DELAYED RELEASE ORAL at 06:07

## 2020-07-27 RX ADMIN — SODIUM CHLORIDE, PRESERVATIVE FREE 10 ML: 5 INJECTION INTRAVENOUS at 01:53

## 2020-07-27 RX ADMIN — METHYLPREDNISOLONE SODIUM SUCCINATE 30 MG: 40 INJECTION, POWDER, LYOPHILIZED, FOR SOLUTION INTRAMUSCULAR; INTRAVENOUS at 08:26

## 2020-07-27 ASSESSMENT — PAIN SCALES - GENERAL: PAINLEVEL_OUTOF10: 0

## 2020-07-27 NOTE — DISCHARGE SUMMARY
[facility] Physician Discharge Summary       Luis Manuel Camejo MD  [address]  [phone]    Call in 1 week        Activity level: As tolerated    Diet: DIET GENERAL; Low Fiber, Lactose Controlled    Dispo: Home    Condition at discharge: Stable    Patient ID:  Randye Grounds  [id number]  17 y.o.  1990    Admit date: 7/24/2020    Discharge date and time:  7/27/2020  11:47 AM    Admission Diagnoses: Active Problems:    Gastroenteritis    Enterocolitis    IBD (inflammatory bowel disease)  Resolved Problems:    * No resolved hospital problems. *      Discharge Diagnoses: Active Problems:    Gastroenteritis    Enterocolitis    IBD (inflammatory bowel disease)  Resolved Problems:    * No resolved hospital problems. *      Consults:  IP CONSULT TO GI    Procedures: None    Hospital Course: Patient was admitted with Gastroenteritis [K52.9]  Gastroenteritis [K52.9]  IBD (inflammatory bowel disease) [K52.9]. Patient is a 59-year-old female with no significant past medical history who presented to the ED on 7-24-20 complaining of nausea, vomiting and diarrhea. Patient was initially treated with IV fluids, however diarrhea and nausea persisted. GI was consulted, stool studies were negative for infectious etiology. Inflammatory bowel disease is currently being ruled out. Patient was treated with steroids and is to continue a taper at discharge. Patient is to have colonoscopy as outpatient with Dr. Varun Givens after discharge. Patient's diarrhea has largely resolved and she is now eating a normal diet. Patient will be discharged home today to follow-up with primary care doctor and GI within 1 week.     Discharge Exam:  Vitals:    07/25/20 2054 07/26/20 0745 07/26/20 2049 07/27/20 0800   BP: (!) 100/54 (!) 105/52 (!) 104/59 106/69   Pulse: 80 80 75 73   Resp: 16 18 16 18   Temp: 97.9 °F (36.6 °C) 97.8 °F (36.6 °C) 98.1 °F (36.7 °C) 97.6 °F (36.4 °C) TempSrc: Infrared Oral Oral Infrared   SpO2: 98% 96% 98% 98%   Weight:       Height:           General Appearance: well-developed and well nourished, in no acute distress and alert  Skin: warm and dry, no rash or erythema  Pulmonary/Chest: clear to auscultation bilaterally- no wheezes, rales or rhonchi, normal air movement, no respiratory distress  Cardiovascular: normal rate, regular rhythm, normal S1 and S2, no murmurs, no gallops and no JVD  Abdomen: soft, non-tender, non-distended, normal bowel sounds, no masses or organomegaly  I/O last 3 completed shifts: In: 130 [P.O.:120; I.V.:10]  Out: -   I/O this shift:  In: 240 [P.O.:240]  Out: -       LABS:  Recent Labs     07/25/20  0530 07/26/20  0926 07/27/20  0548    139 137   K 3.8 4.2 4.2    109* 107   CO2 16* 12* 17*   BUN 7 5* 5*   CREATININE 0.7 0.6 0.5   GLUCOSE 81 67* 118*   CALCIUM 8.5* 8.3* 8.8       Recent Labs     07/25/20  0530 07/26/20  0944 07/27/20  0548   WBC 7.3 7.0 9.5   RBC 4.46 4.61 4.62   HGB 13.6 14.2 14.1   HCT 41.0 42.5 41.5   MCV 91.9 92.2 89.8   MCH 30.5 30.8 30.5   MCHC 33.2 33.4 34.0   RDW 13.5 13.6 13.3   * 121* 141   MPV 13.1* 12.8* 13.0*       No results for input(s): POCGLU in the last 72 hours. Imaging:   CT ABDOMEN PELVIS WO CONTRAST   Final Result      1. Diffuse small and large bowel enteritis with multiple fluid-filled   bowel loops showing minimal distention and no focal point of   obstruction. Consistent with infectious etiology. 2. Minimal free fluid in the pelvis. 3. Cholelithiasis with a contracted gallbladder, no obvious   cholecystitis.                    Patient Instructions:      Medication List      START taking these medications    pantoprazole 40 MG tablet  Commonly known as:  PROTONIX  Take 1 tablet by mouth every morning (before breakfast)  Start taking on:  July 28, 2020     predniSONE 10 MG tablet  Commonly known as:  DELTASONE  Take 3 tablets by mouth daily for 14 days, THEN 2 tablets daily for 14 days, THEN 1 tablet daily for 10 days. Start taking on:  July 27, 2020        CONTINUE taking these medications    PRENATAL 1+1 PO           Where to Get Your Medications      These medications were sent to [address], [address] [place] [id number]    Phone:  [phone]   · pantoprazole 40 MG tablet  · predniSONE 10 MG tablet           Note that more than 30 minutes was spent in preparing discharge papers, discussing discharge with patient, medication review, etc.    Signed:  Electronically signed by Petros Sosa PA-C on 7/27/2020 at 11:47 AM    NOTE: This report was transcribed using voice recognition software. Every effort was made to ensure accuracy; however, inadvertent computerized transcription errors may be present.

## 2020-07-27 NOTE — CARE COORDINATION
CM NOTE: Per pt---t lives with  & children & is totally independent. Does not use equipment, O2 & is not diabetic. No past medical hx other than the births of her 2 children. PCP is Dr. Edgardo Cody in [place], [place] but hasn't seen her in 4 years. No needs at this time. Plan is to return home. CM & SW continue to follow pt.

## 2020-07-27 NOTE — CARE COORDINATION
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 I discussed with Mechelle Hamm NP with GI. She states she will talk with Dr. Julio Hsu and contact the patient regarding any plans to change treatment.

## 2020-07-27 NOTE — PROGRESS NOTES
Spoke with Mechelle Hamm GI NP via telephone, okay for discharge from GI standpoint. Dr. Gabe Blackwood notified.

## 2020-07-27 NOTE — PROGRESS NOTES
[facility] Quality Flow/Interdisciplinary Rounds Progress Note        Quality Flow Rounds held on July 27, 2020    Disciplines Attending:  Bedside Nurse, ,  and Nursing Unit Leadership    Emory Ingram was admitted on 7/24/2020  6:32 AM    Anticipated Discharge Date:  Expected Discharge Date: 07/27/20    Disposition:    Eron Score:  Eron Scale Score: 21    Readmission Risk              Risk of Unplanned Readmission:        7           Discussed patient goal for the day, patient clinical progression, and barriers to discharge. The following Goal(s) of the Day/Commitment(s) have been identified:  Check GI consult's plan.       Sandy Rehman  July 27, 2020

## 2020-07-27 NOTE — PROGRESS NOTES
Shad Rashid, patient's mother, notified via telephone patient forgot several belongings (bath towel and socks). States she will notify patient and someone will pick them up today or tomorrow.

## 2020-07-27 NOTE — PROGRESS NOTES
PROGRESS NOTE    Patient Presents with/Seen in Consultation For      *Reason for Consult: Abd pain and diarrhea, ? IBD      CHIEF COMPLAINT: Nausea, vomiting, diarrhea, and abdominal pain    Subjective:     Patient seeing sitting up in bed, in NAD. States she is feeling so much better than she was. Tolerating diet. Denies any N/V, or abdominal pain. Had 2 stools since yesterday. Last stool small brown formed. Patient asking for discharge today, agree. Patient states the Bentyl caused her blurry vision, instructed to stop taking it. POC reviewed with the patient, all questions answered. Review of Systems  Aside from what was mentioned in the PMH and HPI, essentially unremarkable, all others negative. Objective:     Patient Vitals for the past 8 hrs:   BP Temp Temp src Pulse Resp SpO2   07/27/20 0800 106/69 97.6 °F (36.4 °C) Infrared 73 18 98 %       General appearance: alert, awake, laying in bed, and cooperative  Eyes: conjunctivae/corneas clear. PERRL.   Lungs: clear to auscultation bilaterally  Heart: regular rate and rhythm, no murmur, 2+ pulses; without edema  Abdomen: soft, non-tender; bowel sounds normal; no masses  Extremities: extremities without edema  Pulses: 2+ and symmetric  Skin: Skin color, texture, turgor normal.   Neurologic: Grossly normal    methylPREDNISolone sodium (SOLU-MEDROL) injection 30 mg, Q8H  pantoprazole (PROTONIX) tablet 40 mg, QAM AC  0.9 % sodium chloride infusion, Continuous  loperamide (IMODIUM) capsule 2 mg, 4x Daily PRN  sodium chloride flush 0.9 % injection 10 mL, 2 times per day  sodium chloride flush 0.9 % injection 10 mL, PRN  acetaminophen (TYLENOL) tablet 650 mg, Q6H PRN    Or  acetaminophen (TYLENOL) suppository 650 mg, Q6H PRN  polyethylene glycol (GLYCOLAX) packet 17 g, Daily PRN  promethazine (PHENERGAN) tablet 12.5 mg, Q6H PRN    Or  ondansetron (ZOFRAN) injection 4 mg, Q6H PRN  enoxaparin (LOVENOX) injection 40 mg, Daily         Data Review  CBC:   Lab Results Component Value Date    WBC 9.5 07/27/2020    RBC 4.62 07/27/2020    HGB 14.1 07/27/2020    HCT 41.5 07/27/2020    MCV 89.8 07/27/2020    MCH 30.5 07/27/2020    MCHC 34.0 07/27/2020    RDW 13.3 07/27/2020     07/27/2020    MPV 13.0 07/27/2020     CMP:    Lab Results   Component Value Date     07/27/2020    K 4.2 07/27/2020    K 3.8 07/25/2020     07/27/2020    CO2 17 07/27/2020    BUN 5 07/27/2020    CREATININE 0.5 07/27/2020    GFRAA >60 07/27/2020    LABGLOM >60 07/27/2020    GLUCOSE 118 07/27/2020    PROT 6.1 07/27/2020    LABALBU 3.8 07/27/2020    CALCIUM 8.8 07/27/2020    BILITOT 0.5 07/27/2020    ALKPHOS 44 07/27/2020    AST 50 07/27/2020    ALT 96 07/27/2020     Hepatic Function Panel:    Lab Results   Component Value Date    ALKPHOS 44 07/27/2020    ALT 96 07/27/2020    AST 50 07/27/2020    PROT 6.1 07/27/2020    BILITOT 0.5 07/27/2020    LABALBU 3.8 07/27/2020       Assessment:     Active Problems:  · Enterocolitis, stools negative for infectious etiology, will R/O IBD  · Diarrhea  · Abdominal cramping, diffuse  · Nausea and vomiting  · Thrombocytopenia  · Hypokalemia-defer to PCP    Plan:       · Stool studies- negative thus far; others still pending  · IBD panel- pending  · DC Solu-Medrol IV   · Steroid taper dose sent to patient's pharmacy on file  · DC Bentyl   · Monitor CBC, CMP daily  · Colonoscopy to be scheduled with Dr. Marielos Dean as outpatient once current colitis resolves, as OP  · Low fiber, lactose controlled diet  · OP follow up in 1 month  · OK to DC from GI POV; when OK with others    Discussed with Dr. Yosvany Hough per Dr. Victor Manuel Byrne, NP-C 7/27/2020 9:00 AM For Dr. Marielos Dean

## 2020-07-28 LAB
FECAL NEUTRAL FAT: NORMAL
FECAL SPLIT FATS: NORMAL

## 2020-07-30 LAB
CALPROTECTIN: 1247 UG/G
Lab: NORMAL
REPORT: NORMAL
THIS TEST SENT TO: NORMAL

## 2020-12-03 ENCOUNTER — VIRTUAL VISIT (OUTPATIENT)
Dept: FAMILY MEDICINE CLINIC | Age: 30
End: 2020-12-03
Payer: COMMERCIAL

## 2020-12-03 PROCEDURE — 99385 PREV VISIT NEW AGE 18-39: CPT | Performed by: FAMILY MEDICINE

## 2020-12-03 ASSESSMENT — ENCOUNTER SYMPTOMS
SHORTNESS OF BREATH: 0
WHEEZING: 0
VOMITING: 0
NAUSEA: 0

## 2020-12-03 ASSESSMENT — PATIENT HEALTH QUESTIONNAIRE - PHQ9
SUM OF ALL RESPONSES TO PHQ QUESTIONS 1-9: 0
SUM OF ALL RESPONSES TO PHQ QUESTIONS 1-9: 0
2. FEELING DOWN, DEPRESSED OR HOPELESS: 0
SUM OF ALL RESPONSES TO PHQ9 QUESTIONS 1 & 2: 0
1. LITTLE INTEREST OR PLEASURE IN DOING THINGS: 0
SUM OF ALL RESPONSES TO PHQ QUESTIONS 1-9: 0

## 2020-12-03 NOTE — PROGRESS NOTES
[address] presents to the office today for   Chief Complaint   Patient presents with   Rehana Bennett in [facility]  Up to date with Pap  No mammogram    2 children - 17 months and almost 1years old  Hyperemesis with first pregnancy  Early contractions with second pregnancy but made it full term  No gestational diabetes or hypertension  Planning pregnancy in next few months  Wants to make sure healthy prior to getting pregnant again    Cryptosporidium in the summer  Symptoms resolved    Increase in body hair  Seeks hormone testing    Review of Systems   Constitutional: Negative for chills and fever. Respiratory: Negative for shortness of breath and wheezing. Cardiovascular: Negative for chest pain and palpitations. Gastrointestinal: Negative for nausea and vomiting. Genitourinary: Negative for dysuria, hematuria and urgency. Skin: Negative for rash. Neurological: Negative for dizziness and light-headedness. There were no vitals taken for this visit. Physical Exam  Constitutional:       Appearance: Normal appearance. HENT:      Head: Normocephalic and atraumatic. Eyes:      Extraocular Movements: Extraocular movements intact. Conjunctiva/sclera: Conjunctivae normal.   Cardiovascular:      Rate and Rhythm: Normal rate. Pulmonary:      Effort: Pulmonary effort is normal.   Skin:     General: Skin is warm. Neurological:      Mental Status: She is alert and oriented to person, place, and time. Psychiatric:         Mood and Affect: Mood normal.         Behavior: Behavior normal.            Current Outpatient Medications:     Prenatal Vit-Fe Fumarate-FA (PRENATAL 1+1 PO), Take 1 tablet by mouth daily as needed Indications: Tabes Dorsalis 1, Disp: , Rfl:      Past Medical History:   Diagnosis Date    Abnormal Pap smear of cervix     hpv leep        Kai Bill was seen today for established new doctor.     Diagnoses and all orders for this visit:    Preventative health care  -     CBC; Future  -     Comprehensive Metabolic Panel; Future  -     Lipid Panel; Future  -     TSH without Reflex; Future    Hirsutism  -     Comprehensive Metabolic Panel; Future  -     TSH without Reflex; Future  -     Testosterone, free, total; Future  -     CORTISOL; Future    Vitamin D deficiency  -     Vitamin D 25 Hydroxy; Future       Check labs  Flu shot at pharmacy    Venkat Ambrocio is a 31 y.o. female being evaluated by a Virtual Visit (video visit) encounter to address concerns as mentioned above. A caregiver was present when appropriate. Due to this being a TeleHealth encounter (During OAE-34 public health emergency), evaluation of the following organ systems was limited: Vitals/Constitutional/EENT/Resp/CV/GI//MS/Neuro/Skin/Heme-Lymph-Imm. Pursuant to the emergency declaration under the [address] [place] [address], [address] [place] [address] authority and the [employer] and [employer] Act, this Virtual Visit was conducted with patient's (and/or legal guardian's) consent, to reduce the patient's risk of exposure to COVID-19 and provide necessary medical care. The patient (and/or legal guardian) has also been advised to contact this office for worsening conditions or problems, and seek emergency medical treatment and/or call 911 if deemed necessary. Patient identification was verified at the start of the visit: Yes    Total time spent for this encounter: Not billed by time    Services were provided through a video synchronous discussion virtually to substitute for in-person clinic visit. Patient was at home and provider at office. --Abiodun Hernandez MD on 12/3/2020 at 10:50 AM    An electronic signature was used to authenticate this note.     Abiodun Hernandez MD

## 2020-12-07 DIAGNOSIS — Z00.00 PREVENTATIVE HEALTH CARE: ICD-10-CM

## 2020-12-07 DIAGNOSIS — L68.0 HIRSUTISM: ICD-10-CM

## 2020-12-07 DIAGNOSIS — E55.9 VITAMIN D DEFICIENCY: ICD-10-CM

## 2020-12-07 LAB
ALBUMIN SERPL-MCNC: 4.4 G/DL (ref 3.5–5.2)
ALP BLD-CCNC: 40 U/L (ref 35–104)
ALT SERPL-CCNC: 9 U/L (ref 0–32)
ANION GAP SERPL CALCULATED.3IONS-SCNC: 12 MMOL/L (ref 7–16)
AST SERPL-CCNC: 14 U/L (ref 0–31)
BILIRUB SERPL-MCNC: 0.8 MG/DL (ref 0–1.2)
BUN BLDV-MCNC: 11 MG/DL (ref 6–20)
CALCIUM SERPL-MCNC: 9.9 MG/DL (ref 8.6–10.2)
CHLORIDE BLD-SCNC: 104 MMOL/L (ref 98–107)
CHOLESTEROL, TOTAL: 156 MG/DL (ref 0–199)
CO2: 25 MMOL/L (ref 22–29)
CREAT SERPL-MCNC: 0.7 MG/DL (ref 0.5–1)
GFR AFRICAN AMERICAN: >60
GFR NON-AFRICAN AMERICAN: >60 ML/MIN/1.73
GLUCOSE BLD-MCNC: 90 MG/DL (ref 74–99)
HCT VFR BLD CALC: 41.1 % (ref 34–48)
HDLC SERPL-MCNC: 71 MG/DL
HEMOGLOBIN: 13.4 G/DL (ref 11.5–15.5)
LDL CHOLESTEROL CALCULATED: 75 MG/DL (ref 0–99)
MCH RBC QN AUTO: 30.5 PG (ref 26–35)
MCHC RBC AUTO-ENTMCNC: 32.6 % (ref 32–34.5)
MCV RBC AUTO: 93.6 FL (ref 80–99.9)
PDW BLD-RTO: 13.2 FL (ref 11.5–15)
PLATELET # BLD: 142 E9/L (ref 130–450)
PMV BLD AUTO: 13.2 FL (ref 7–12)
POTASSIUM SERPL-SCNC: 4.4 MMOL/L (ref 3.5–5)
RBC # BLD: 4.39 E12/L (ref 3.5–5.5)
SODIUM BLD-SCNC: 141 MMOL/L (ref 132–146)
TOTAL PROTEIN: 7 G/DL (ref 6.4–8.3)
TRIGL SERPL-MCNC: 51 MG/DL (ref 0–149)
TSH SERPL DL<=0.05 MIU/L-ACNC: 2.08 UIU/ML (ref 0.27–4.2)
VITAMIN D 25-HYDROXY: 22 NG/ML (ref 30–100)
VLDLC SERPL CALC-MCNC: 10 MG/DL
WBC # BLD: 5.9 E9/L (ref 4.5–11.5)

## 2020-12-08 LAB
SEX HORMONE BINDING GLOBULIN: 62 NMOL/L (ref 30–135)
TESTOSTERONE FREE-NONMALE: 4.4 PG/ML (ref 0.8–7.4)
TESTOSTERONE TOTAL: 37 NG/DL (ref 20–70)

## 2020-12-12 LAB — CORTISOL TOTAL: 8.12 MCG/DL (ref 2.68–18.4)

## 2021-07-20 ENCOUNTER — HOSPITAL ENCOUNTER (OUTPATIENT)
Age: 31
Discharge: HOME | End: 2021-07-20
Attending: OBSTETRICS & GYNECOLOGY | Admitting: OBSTETRICS & GYNECOLOGY
Payer: COMMERCIAL

## 2021-07-20 VITALS
RESPIRATION RATE: 16 BRPM | OXYGEN SATURATION: 100 % | HEART RATE: 79 BPM | TEMPERATURE: 98.9 F | DIASTOLIC BLOOD PRESSURE: 62 MMHG | SYSTOLIC BLOOD PRESSURE: 107 MMHG

## 2021-07-20 PROBLEM — Z3A.33 33 WEEKS GESTATION OF PREGNANCY: Status: ACTIVE | Noted: 2021-07-20

## 2021-07-20 LAB
AMNISURE, POC: NEGATIVE
Lab: NORMAL
NEGATIVE QC PASS/FAIL: NORMAL
POSITIVE QC PASS/FAIL: NORMAL

## 2021-07-20 PROCEDURE — 99211 OFF/OP EST MAY X REQ PHY/QHP: CPT

## 2021-07-20 PROCEDURE — 84112 EVAL AMNIOTIC FLUID PROTEIN: CPT

## 2021-07-20 RX ORDER — OMEPRAZOLE 10 MG/1
10 CAPSULE, DELAYED RELEASE ORAL DAILY
Status: ON HOLD | COMMUNITY
End: 2021-08-28 | Stop reason: HOSPADM

## 2021-07-21 NOTE — PROGRESS NOTES
Discharge instructions reviewed with patient. Patient verbalizes understanding without any questions at this time.

## 2021-07-21 NOTE — H&P
Department of Obstetrics and Gynecology  Attending Obstetrics History and Physical        CHIEF COMPLAINT:  leakiing fluid     HISTORY OF PRESENT ILLNESS:      The patient is a 36 y.o. female , No LMP recorded. Patient is pregnant. ,  at 33w6d. OB History        3    Para   2    Term   2            AB        Living   1       SAB        TAB        Ectopic        Molar        Multiple   0    Live Births   1            Patient presents with a chief complaint as above. Estimated Due Date: Estimated Date of Delivery: 21    PRENATAL CARE:    Complicated by:   Patient Active Problem List   Diagnosis Code    Normal pregnancy in third trimester Z34.93    39 weeks gestation of pregnancy Z3A.39    Abdominal pain during pregnancy, third trimester O26.893, R10.9    Normal pregnancy, antepartum Z34.90    Gastroenteritis K52.9    Enterocolitis K52.9    IBD (inflammatory bowel disease) K52.9       PAST OB HISTORY  OB History        3    Para   2    Term   2            AB        Living   1       SAB        TAB        Ectopic        Molar        Multiple   0    Live Births   1                Past Medical History:        Diagnosis Date    Abnormal Pap smear of cervix     hpv leep      Past Surgical History:        Procedure Laterality Date    BREAST REDUCTION SURGERY      BREAST REDUCTION SURGERY      LEEP      WISDOM TOOTH EXTRACTION       Social History:    TOBACCO:   reports that she has never smoked. She has never used smokeless tobacco.  ETOH:   reports no history of alcohol use. DRUGS:   reports no history of drug use.   Family History:       Problem Relation Age of Onset    No Known Problems Mother     Hypertension Father     No Known Problems Sister     Cancer Paternal Grandmother     Cancer Paternal Grandfather      Medications Prior to Admission:  Medications Prior to Admission: omeprazole (PRILOSEC) 10 MG delayed release capsule, Take 10 mg by mouth daily  Prenatal Vit-Fe Fumarate-FA (PRENATAL 1+1 PO), Take 1 tablet by mouth daily as needed Indications: Tabes Dorsalis 1  Allergies:  Bactrim [sulfamethoxazole-trimethoprim] and Sulfa antibiotics    Review of Systems:   Ears, nose, mouth, throat, and face: negative  Respiratory: negative  Cardiovascular: negative  Gastrointestinal: negative  Genitourinary:negative  Integument/breast: negative  Hematologic/lymphatic: negative  Musculoskeletal:negative  Neurological: negative  Behavioral/Psych: negative  Endocrine: negative  Allergic/Immunologic: negative    PHYSICAL EXAM:    General appearance:  awake, alert, cooperative, no apparent distress, and appears stated age  Neurologic:  Awake, alert, oriented to name, place and time. Cranial nerves II-XII are grossly intact. Motor is 5 out of 5 bilaterally. Cerebellar finger to nose, heel to shin intact. Sensory is intact.   Babinski down going, Romberg negative, and gait is normal.  Lungs:  No increased work of breathing, good air exchange, clear to auscultation bilaterally, no crackles or wheezing  Heart:  Normal apical impulse, regular rate and rhythm, normal S1 and S2, no S3 or S4, and no murmur noted  Abdomen:  No scars, normal bowel sounds, soft, non-distended, non-tender, no masses palpated, no hepatosplenomegally  Fetal heart rate:  Baseline Heart Rate 140, accelerations:  present, decelerations:  absent  Pelvis:  External Genitalia: General appearance; normal, Hair distribution; normal, Lesions absent  Cervix:    DILATION:  ftcm  EFFACEMENT:   50%  STATION:  -3cm      Contraction frequency:  Rare minutes  Membranes:  Intact = aqmnisure neg  sono = vtx    /62   Pulse 79   Temp 98.9 °F (37.2 °C)   Resp 16   SpO2 100%     General Labs:    CBC:   Lab Results   Component Value Date    WBC 5.9 12/07/2020    RBC 4.39 12/07/2020    HGB 13.4 12/07/2020    HCT 41.1 12/07/2020    MCV 93.6 12/07/2020    RDW 13.2 12/07/2020     12/07/2020     CMP:    Lab Results   Component Value Date     12/07/2020    K 4.4 12/07/2020    K 3.8 07/25/2020     12/07/2020    CO2 25 12/07/2020    BUN 11 12/07/2020    PROT 7.0 12/07/2020     U/A:  No components found for: Sanjeev Armor, UPH, UPROTEIN, UGLUCOSE, UKETONE, UBILI, UBLOOD, UNITRITE, UUROBIL, [place], [facility], [place], [facility], [place], [facility], [place]    ASSESSMENT AND PLAN:  IUP at 33 weeks  amnisure neg  Dr. Duyen Burgess for Aminatalinda Laneer notified      Electronically signed by Roberto Julien MD on 7/20/2021 at 9:55 PM

## 2021-07-21 NOTE — PROGRESS NOTES
Pt arrives  33W6D complaining of some LOF. She denies feeling a large gush just notes \"I got up and it felt wet down there. \" She states good FM and denies any VB. No other complaints noted.

## 2021-08-16 ENCOUNTER — HOSPITAL ENCOUNTER (OUTPATIENT)
Age: 31
Discharge: HOME | End: 2021-08-16
Attending: OBSTETRICS & GYNECOLOGY | Admitting: OBSTETRICS & GYNECOLOGY
Payer: COMMERCIAL

## 2021-08-16 VITALS — SYSTOLIC BLOOD PRESSURE: 128 MMHG | DIASTOLIC BLOOD PRESSURE: 61 MMHG | HEART RATE: 99 BPM | RESPIRATION RATE: 14 BRPM

## 2021-08-16 PROBLEM — O36.8190 DECREASED FETAL MOVEMENT AFFECTING MANAGEMENT OF MOTHER, ANTEPARTUM: Status: ACTIVE | Noted: 2021-08-16

## 2021-08-16 PROCEDURE — 99203 OFFICE O/P NEW LOW 30 MIN: CPT | Performed by: MIDWIFE

## 2021-08-16 PROCEDURE — 59025 FETAL NON-STRESS TEST: CPT

## 2021-08-16 PROCEDURE — 99211 OFF/OP EST MAY X REQ PHY/QHP: CPT

## 2021-08-16 NOTE — PROGRESS NOTES
Discharge instructions given to patient. Patient states that she verbally understands instructions. Patient educated on  labor signs and when to return to unit. Patient has no questions at this time.  Patient ambulated off floor\

## 2021-08-16 NOTE — H&P
Department of Obstetrics and Gynecology  Nurse Practitioner Obstetrics History and Physical        CHIEF COMPLAINT:  Decreased fetal movement    HISTORY OF PRESENT ILLNESS:  Fernanda Vergara is a 36 y.o. female , No LMP recorded. Patient is pregnant. ,  at 37w5d. Presents to L&D with decreased fetal movement yesterday and today. Is feeling movement now. Denies bleeding or LOF, no painful cramping/contractions. Pregnancy uncomplicated, history of 2 previous vaginal births at term      OB History        3    Para   2    Term   2            AB        Living   1       SAB        TAB        Ectopic        Molar        Multiple   0    Live Births   1                Estimated Due Date: Estimated Date of Delivery: 21      Pregnancy complicated by:   Patient Active Problem List   Diagnosis Code    Normal pregnancy in third trimester Z34.93    39 weeks gestation of pregnancy Z3A.39    Abdominal pain during pregnancy, third trimester O26.893, R10.9    Normal pregnancy, antepartum Z34.90    Gastroenteritis K52.9    Enterocolitis K52.9    IBD (inflammatory bowel disease) K52.9    33 weeks gestation of pregnancy Z3A.33    Decreased fetal movement affecting management of mother, antepartum O36.8190           PAST OB HISTORY  OB History        3    Para   2    Term   2            AB        Living   1       SAB        TAB        Ectopic        Molar        Multiple   0    Live Births   1                  Past Medical History:          Diagnosis Date    Abnormal Pap smear of cervix     hpv leep        Past Surgical History:          Procedure Laterality Date    BREAST REDUCTION SURGERY      BREAST REDUCTION SURGERY      LEEP  2012    WISDOM TOOTH EXTRACTION         Social History:    TOBACCO:   reports that she has never smoked. She has never used smokeless tobacco.  ETOH:   reports no history of alcohol use. DRUGS:   reports no history of drug use.   Family History: Problem Relation Age of Onset    No Known Problems Mother     Hypertension Father     No Known Problems Sister     Cancer Paternal Grandmother     Cancer Paternal Grandfather        Medications Prior to Admission:  Medications Prior to Admission: omeprazole (PRILOSEC) 10 MG delayed release capsule, Take 10 mg by mouth daily  Prenatal Vit-Fe Fumarate-FA (PRENATAL 1+1 PO), Take 1 tablet by mouth daily as needed Indications: Tabes Dorsalis 1    Allergies:  Bactrim [sulfamethoxazole-trimethoprim] and Sulfa antibiotics      REVIEW OF SYSTEMS:          CONSTITUTIONAL :      No fever, no chills   HEENT :                         Headache absent,   visual disturbances absent  CARDIOVASCULAR :    No chest pain, no palpitations, no edema   RESPIRATORY :            No pain, no shortness of breath   GASTROINTESTINAL : No N/V, no D/C,    abdominal pain absent   GENITOURINARY :      Dysuria   absent,   hematuria absent,   urinary frequency absent  MUSCULOSKELETAL:  No myalgia,   back pain absent  NEUROLOGICAL :        No migraine, no seizures. Pertinent positives and negatives addressed in HPI, other systems reviewed and negative      PHYSICAL EXAM:    /61   Pulse 99   Resp 14     General appearance:  awake, alert, cooperative, no apparent distress, and appears stated age  Neurologic:  Awake, alert, oriented to name, place and time.   Ambulatory to unit      Lungs:  Respirations easy and unlabored    Abdomen:   soft, gravid, non-tender,   CVA tenderness NA   Fetal position vertex by Leopold's   EFW AGA  Fetal heart rate:  Baseline Heart Rate 135   Variability moderate   Accelerations Present   Decelerations absent  Pelvis:  External Genitalia: Lesions na  SSE:  NA  Cervix:    deferred    Contractions:  none  Membranes:  intact      GBS: unknown      Impression:  36 y.o.  37w5d, decreased fetal movement, reactive NST    Call out to Dr. Erik Garrison, message left with office and on her cell phone     Plan: Awaiting return call        Electronically signed by TONI Tolentino CNM on 8/16/2021 at 3:30 PM

## 2021-08-16 NOTE — PROGRESS NOTES
Spoke with dr Kiley Munoz. Notified of reactive NST with moderate variability and accels. Keep on monitor for another 15 min and patient may be discharged home.

## 2021-08-16 NOTE — PROGRESS NOTES
37.5    Here from home with complaints of decreased fetal movement. Patient states last couple days movements are decreased. States \" I can only get him to increase movements with eating and drinking\". Asked patient how many times in an hour she feels movement. States \" I dont know less than 10\". Patient reports teri dinero which makes \"feeling movement more difficult\".

## 2021-08-27 ENCOUNTER — APPOINTMENT (OUTPATIENT)
Dept: LABOR AND DELIVERY | Age: 31
End: 2021-08-27
Payer: COMMERCIAL

## 2021-08-27 ENCOUNTER — ANESTHESIA (OUTPATIENT)
Dept: LABOR AND DELIVERY | Age: 31
End: 2021-08-27
Payer: COMMERCIAL

## 2021-08-27 ENCOUNTER — ANESTHESIA EVENT (OUTPATIENT)
Dept: LABOR AND DELIVERY | Age: 31
End: 2021-08-27
Payer: COMMERCIAL

## 2021-08-27 ENCOUNTER — HOSPITAL ENCOUNTER (INPATIENT)
Age: 31
LOS: 1 days | Discharge: HOME | End: 2021-08-28
Attending: OBSTETRICS & GYNECOLOGY | Admitting: OBSTETRICS & GYNECOLOGY
Payer: COMMERCIAL

## 2021-08-27 LAB
ABO/RH: NORMAL
AMPHETAMINE SCREEN, URINE: NOT DETECTED
ANTIBODY SCREEN: NORMAL
BARBITURATE SCREEN URINE: NOT DETECTED
BENZODIAZEPINE SCREEN, URINE: NOT DETECTED
CANNABINOID SCREEN URINE: NOT DETECTED
COCAINE METABOLITE SCREEN URINE: NOT DETECTED
FENTANYL SCREEN, URINE: NOT DETECTED
HCT VFR BLD CALC: 33 % (ref 34–48)
HEMOGLOBIN: 10.7 G/DL (ref 11.5–15.5)
Lab: NORMAL
MCH RBC QN AUTO: 28.7 PG (ref 26–35)
MCHC RBC AUTO-ENTMCNC: 32.4 % (ref 32–34.5)
MCV RBC AUTO: 88.5 FL (ref 80–99.9)
METHADONE SCREEN, URINE: NOT DETECTED
OPIATE SCREEN URINE: NOT DETECTED
OXYCODONE URINE: NOT DETECTED
PDW BLD-RTO: 13 FL (ref 11.5–15)
PHENCYCLIDINE SCREEN URINE: NOT DETECTED
PLATELET # BLD: 140 E9/L (ref 130–450)
PMV BLD AUTO: 14.1 FL (ref 7–12)
RBC # BLD: 3.73 E12/L (ref 3.5–5.5)
WBC # BLD: 9.5 E9/L (ref 4.5–11.5)

## 2021-08-27 PROCEDURE — 6370000000 HC RX 637 (ALT 250 FOR IP): Performed by: OBSTETRICS & GYNECOLOGY

## 2021-08-27 PROCEDURE — 2500000003 HC RX 250 WO HCPCS: Performed by: ANESTHESIOLOGY

## 2021-08-27 PROCEDURE — 0HQ9XZZ REPAIR PERINEUM SKIN, EXTERNAL APPROACH: ICD-10-PCS | Performed by: OBSTETRICS & GYNECOLOGY

## 2021-08-27 PROCEDURE — 80307 DRUG TEST PRSMV CHEM ANLYZR: CPT

## 2021-08-27 PROCEDURE — 86900 BLOOD TYPING SEROLOGIC ABO: CPT

## 2021-08-27 PROCEDURE — 86901 BLOOD TYPING SEROLOGIC RH(D): CPT

## 2021-08-27 PROCEDURE — 85027 COMPLETE CBC AUTOMATED: CPT

## 2021-08-27 PROCEDURE — 10907ZC DRAINAGE OF AMNIOTIC FLUID, THERAPEUTIC FROM PRODUCTS OF CONCEPTION, VIA NATURAL OR ARTIFICIAL OPENING: ICD-10-PCS | Performed by: OBSTETRICS & GYNECOLOGY

## 2021-08-27 PROCEDURE — 36415 COLL VENOUS BLD VENIPUNCTURE: CPT

## 2021-08-27 PROCEDURE — 86850 RBC ANTIBODY SCREEN: CPT

## 2021-08-27 PROCEDURE — 1220000000 HC SEMI PRIVATE OB R&B

## 2021-08-27 PROCEDURE — 3E033VJ INTRODUCTION OF OTHER HORMONE INTO PERIPHERAL VEIN, PERCUTANEOUS APPROACH: ICD-10-PCS | Performed by: OBSTETRICS & GYNECOLOGY

## 2021-08-27 PROCEDURE — 6360000002 HC RX W HCPCS: Performed by: OBSTETRICS & GYNECOLOGY

## 2021-08-27 PROCEDURE — 51701 INSERT BLADDER CATHETER: CPT

## 2021-08-27 RX ORDER — LIDOCAINE HYDROCHLORIDE 10 MG/ML
INJECTION, SOLUTION INFILTRATION; PERINEURAL
Status: DISCONTINUED
Start: 2021-08-27 | End: 2021-08-27

## 2021-08-27 RX ORDER — ACETAMINOPHEN 325 MG/1
650 TABLET ORAL EVERY 4 HOURS PRN
Status: DISCONTINUED | OUTPATIENT
Start: 2021-08-27 | End: 2021-08-28 | Stop reason: HOSPADM

## 2021-08-27 RX ORDER — MODIFIED LANOLIN
OINTMENT (GRAM) TOPICAL PRN
Status: DISCONTINUED | OUTPATIENT
Start: 2021-08-27 | End: 2021-08-28 | Stop reason: HOSPADM

## 2021-08-27 RX ORDER — DOCUSATE SODIUM 100 MG/1
100 CAPSULE, LIQUID FILLED ORAL 2 TIMES DAILY
Status: DISCONTINUED | OUTPATIENT
Start: 2021-08-27 | End: 2021-08-28 | Stop reason: HOSPADM

## 2021-08-27 RX ORDER — SODIUM CHLORIDE, SODIUM LACTATE, POTASSIUM CHLORIDE, CALCIUM CHLORIDE 600; 310; 30; 20 MG/100ML; MG/100ML; MG/100ML; MG/100ML
INJECTION, SOLUTION INTRAVENOUS CONTINUOUS
Status: DISCONTINUED | OUTPATIENT
Start: 2021-08-27 | End: 2021-08-28 | Stop reason: HOSPADM

## 2021-08-27 RX ORDER — EPHEDRINE SULFATE/0.9% NACL/PF 50 MG/5 ML
10 SYRINGE (ML) INTRAVENOUS EVERY 5 MIN PRN
Status: DISCONTINUED | OUTPATIENT
Start: 2021-08-27 | End: 2021-08-27

## 2021-08-27 RX ORDER — SODIUM CHLORIDE 0.9 % (FLUSH) 0.9 %
5-40 SYRINGE (ML) INJECTION PRN
Status: DISCONTINUED | OUTPATIENT
Start: 2021-08-27 | End: 2021-08-28 | Stop reason: HOSPADM

## 2021-08-27 RX ORDER — SODIUM CHLORIDE 0.9 % (FLUSH) 0.9 %
5-40 SYRINGE (ML) INJECTION PRN
Status: DISCONTINUED | OUTPATIENT
Start: 2021-08-27 | End: 2021-08-27

## 2021-08-27 RX ORDER — IBUPROFEN 800 MG/1
800 TABLET ORAL EVERY 8 HOURS
Status: DISCONTINUED | OUTPATIENT
Start: 2021-08-28 | End: 2021-08-28 | Stop reason: HOSPADM

## 2021-08-27 RX ORDER — SODIUM CHLORIDE, SODIUM LACTATE, POTASSIUM CHLORIDE, AND CALCIUM CHLORIDE .6; .31; .03; .02 G/100ML; G/100ML; G/100ML; G/100ML
500 INJECTION, SOLUTION INTRAVENOUS PRN
Status: DISCONTINUED | OUTPATIENT
Start: 2021-08-27 | End: 2021-08-27

## 2021-08-27 RX ORDER — ACETAMINOPHEN 650 MG
TABLET, EXTENDED RELEASE ORAL
Status: DISCONTINUED
Start: 2021-08-27 | End: 2021-08-27

## 2021-08-27 RX ORDER — SODIUM CHLORIDE, SODIUM LACTATE, POTASSIUM CHLORIDE, CALCIUM CHLORIDE 600; 310; 30; 20 MG/100ML; MG/100ML; MG/100ML; MG/100ML
INJECTION, SOLUTION INTRAVENOUS CONTINUOUS
Status: DISCONTINUED | OUTPATIENT
Start: 2021-08-27 | End: 2021-08-27

## 2021-08-27 RX ORDER — SODIUM CHLORIDE 0.9 % (FLUSH) 0.9 %
5-40 SYRINGE (ML) INJECTION EVERY 12 HOURS SCHEDULED
Status: DISCONTINUED | OUTPATIENT
Start: 2021-08-27 | End: 2021-08-27

## 2021-08-27 RX ORDER — SODIUM CHLORIDE 0.9 % (FLUSH) 0.9 %
5-40 SYRINGE (ML) INJECTION EVERY 12 HOURS SCHEDULED
Status: DISCONTINUED | OUTPATIENT
Start: 2021-08-27 | End: 2021-08-28 | Stop reason: HOSPADM

## 2021-08-27 RX ORDER — SODIUM CHLORIDE 9 MG/ML
25 INJECTION, SOLUTION INTRAVENOUS PRN
Status: DISCONTINUED | OUTPATIENT
Start: 2021-08-27 | End: 2021-08-27

## 2021-08-27 RX ORDER — FERROUS SULFATE 325(65) MG
325 TABLET ORAL 2 TIMES DAILY WITH MEALS
Status: DISCONTINUED | OUTPATIENT
Start: 2021-08-27 | End: 2021-08-28 | Stop reason: HOSPADM

## 2021-08-27 RX ORDER — SODIUM CHLORIDE, SODIUM LACTATE, POTASSIUM CHLORIDE, AND CALCIUM CHLORIDE .6; .31; .03; .02 G/100ML; G/100ML; G/100ML; G/100ML
1000 INJECTION, SOLUTION INTRAVENOUS PRN
Status: DISCONTINUED | OUTPATIENT
Start: 2021-08-27 | End: 2021-08-27

## 2021-08-27 RX ORDER — HYDROCODONE BITARTRATE AND ACETAMINOPHEN 5; 325 MG/1; MG/1
1 TABLET ORAL EVERY 4 HOURS PRN
Status: DISCONTINUED | OUTPATIENT
Start: 2021-08-27 | End: 2021-08-28 | Stop reason: HOSPADM

## 2021-08-27 RX ORDER — ONDANSETRON 2 MG/ML
4 INJECTION INTRAMUSCULAR; INTRAVENOUS EVERY 6 HOURS PRN
Status: DISCONTINUED | OUTPATIENT
Start: 2021-08-27 | End: 2021-08-27

## 2021-08-27 RX ORDER — NALOXONE HYDROCHLORIDE 0.4 MG/ML
0.4 INJECTION, SOLUTION INTRAMUSCULAR; INTRAVENOUS; SUBCUTANEOUS PRN
Status: DISCONTINUED | OUTPATIENT
Start: 2021-08-27 | End: 2021-08-27

## 2021-08-27 RX ORDER — HYDROCODONE BITARTRATE AND ACETAMINOPHEN 5; 325 MG/1; MG/1
2 TABLET ORAL EVERY 4 HOURS PRN
Status: DISCONTINUED | OUTPATIENT
Start: 2021-08-27 | End: 2021-08-28 | Stop reason: HOSPADM

## 2021-08-27 RX ORDER — NALBUPHINE HCL 10 MG/ML
5 AMPUL (ML) INJECTION EVERY 4 HOURS PRN
Status: DISCONTINUED | OUTPATIENT
Start: 2021-08-27 | End: 2021-08-27

## 2021-08-27 RX ORDER — SIMETHICONE 80 MG
80 TABLET,CHEWABLE ORAL 4 TIMES DAILY
Status: DISCONTINUED | OUTPATIENT
Start: 2021-08-27 | End: 2021-08-28 | Stop reason: HOSPADM

## 2021-08-27 RX ORDER — DOCUSATE SODIUM 100 MG/1
100 CAPSULE, LIQUID FILLED ORAL 2 TIMES DAILY
Status: DISCONTINUED | OUTPATIENT
Start: 2021-08-27 | End: 2021-08-27

## 2021-08-27 RX ORDER — SODIUM CHLORIDE 9 MG/ML
25 INJECTION, SOLUTION INTRAVENOUS PRN
Status: DISCONTINUED | OUTPATIENT
Start: 2021-08-27 | End: 2021-08-28 | Stop reason: HOSPADM

## 2021-08-27 RX ADMIN — Medication 15 ML/HR: at 09:15

## 2021-08-27 RX ADMIN — ACETAMINOPHEN 650 MG: 325 TABLET ORAL at 20:36

## 2021-08-27 RX ADMIN — DOCUSATE SODIUM 100 MG: 100 CAPSULE ORAL at 19:44

## 2021-08-27 RX ADMIN — Medication 10 ML: at 09:10

## 2021-08-27 RX ADMIN — Medication: at 19:44

## 2021-08-27 RX ADMIN — Medication 1 MILLI-UNITS/MIN: at 07:16

## 2021-08-27 RX ADMIN — SIMETHICONE 80 MG: 80 TABLET, CHEWABLE ORAL at 19:44

## 2021-08-27 RX ADMIN — Medication 166.7 ML: at 12:23

## 2021-08-27 ASSESSMENT — PAIN SCALES - GENERAL: PAINLEVEL_OUTOF10: 3

## 2021-08-27 NOTE — ANESTHESIA PROCEDURE NOTES
Epidural Block    Patient location during procedure: OB  Reason for block: procedure for pain and labor epidural  Staffing  Performed: resident/CRNA   Anesthesiologist: Roxana Reynolds MD  Resident/CRNA: TONI Rubio CRNA  Epidural  Patient position: sitting  Prep: ChloraPrep  Patient monitoring: continuous pulse ox and frequent blood pressure checks  Approach: midline  Location: lumbar (1-5)  Injection technique: CHE air  Provider prep: mask and sterile gloves  Needle  Needle type: Tuohy   Needle gauge: 18 G  Needle length: 3.5 in  Needle insertion depth: 5.5 cm  Catheter type: end hole  Catheter size: 20 G.   Catheter at skin depth: 13 cm  Test dose: negative  Assessment  Hemodynamics: stable  Attempts: 1

## 2021-08-27 NOTE — ANESTHESIA PRE PROCEDURE
Department of Anesthesiology  Preprocedure Note       Name:  Efren Garcia   Age:  36 y.o.  :  1990                                          MRN:  [id number]         Date:  2021      Surgeon: * No surgeons listed *    Procedure: * No procedures listed *    Medications prior to admission:   Prior to Admission medications    Medication Sig Start Date End Date Taking?  Authorizing Provider   omeprazole (PRILOSEC) 10 MG delayed release capsule Take 10 mg by mouth daily   Yes Historical Provider, MD   Prenatal Vit-Fe Fumarate-FA (PRENATAL 1+1 PO) Take 1 tablet by mouth daily as needed Indications: Tabes Dorsalis 1   Yes Historical Provider, MD       Current medications:    Current Facility-Administered Medications   Medication Dose Route Frequency Provider Last Rate Last Admin    lactated ringers infusion   IntraVENous Continuous Parminder Mobley MD        lactated ringers bolus  500 mL IntraVENous PRN Michaela Rosario MD        Or    lactated ringers bolus  1,000 mL IntraVENous PRN Michaela Rosario MD        sodium chloride flush 0.9 % injection 5-40 mL  5-40 mL IntraVENous 2 times per day Michaela Rosario MD        sodium chloride flush 0.9 % injection 5-40 mL  5-40 mL IntraVENous PRN Michaela Rosario MD        0.9 % sodium chloride infusion  25 mL IntraVENous PRN Michaela Rosario MD        oxytocin (PITOCIN) 30 units in 500 mL infusion  87.3 elmer-units/min IntraVENous Continuous PRN Michaela Rosario MD        And    oxytocin (PITOCIN) 10 unit bolus from the bag  10 Units IntraVENous PRN Michaela Rosario MD        ondansetron (ZOFRAN) injection 4 mg  4 mg IntraVENous Q6H PRN Michaela Rosario MD        docusate sodium (COLACE) capsule 100 mg  100 mg Oral BID Michaela Rosario MD        oxytocin (PITOCIN) 30 units in 500 mL infusion  1-20 elmer-units/min IntraVENous Continuous Michaela Rosario MD 4 mL/hr at 2102 4 elmer-units/min at 21 09    povidone-iodine (BETADINE) 10 % external solution             lidocaine 1 % injection             naloxone (NARCAN) injection 0.4 mg  0.4 mg IntraVENous PRN Kvng Rodriguez MD        nalbuphine (NUBAIN) injection 5 mg  5 mg IntraVENous Q4H PRN Kvng Rodriguez MD        ondansetron [facility]) injection 4 mg  4 mg IntraVENous Q6H PRN Kvng Rodriguez MD        fentaNYL 1.85mcg/ml and bupivacaine 0.1% 15ml syringe (Home Care) (OB) 15 mL epidural  15 mL Epidural Once Kvng Rodriguez MD        fentaNYL 1.85mcg/ml and Bupivicaine 0.1% in 0.9% NS 135ml infusion (OB) epidural  15 mL/hr Epidural Continuous Kvng Rodriguez MD           Allergies:     Allergies   Allergen Reactions    Bactrim [Sulfamethoxazole-Trimethoprim] Rash    Sulfa Antibiotics Rash       Problem List:    Patient Active Problem List   Diagnosis Code    Normal pregnancy in third trimester Z34.93    39 weeks gestation of pregnancy Z3A.39    Abdominal pain during pregnancy, third trimester O26.893, R10.9    Normal pregnancy, antepartum Z34.90    Gastroenteritis K52.9    Enterocolitis K52.9    IBD (inflammatory bowel disease) K52.9    33 weeks gestation of pregnancy Z3A.33    Decreased fetal movement affecting management of mother, antepartum O40.1       Past Medical History:        Diagnosis Date    Abnormal Pap smear of cervix     hpv leep        Past Surgical History:        Procedure Laterality Date    BREAST REDUCTION SURGERY  2009    BREAST REDUCTION SURGERY  2009    LEEP  2012    WISDOM TOOTH EXTRACTION  2014       Social History:    Social History     Tobacco Use    Smoking status: Never Smoker    Smokeless tobacco: Never Used   Substance Use Topics    Alcohol use: No     Comment: social                                Counseling given: Not Answered      Vital Signs (Current):   Vitals:    08/27/21 0607 08/27/21 0730 08/27/21 0751   BP: 132/80  117/64   Pulse: 93  75   Temp:   36.6 °C (97.8 °F)   Weight:  170 lb (77.1 kg)    Height:  5' 3\" (1.6 m) BP Readings from Last 3 Encounters:   08/27/21 117/64   08/16/21 128/61   07/20/21 107/62       NPO Status: Time of last liquid consumption: 0530                        Time of last solid consumption: 0530                        Date of last liquid consumption: 08/27/21                        Date of last solid food consumption: 08/27/21    BMI:   Wt Readings from Last 3 Encounters:   08/27/21 170 lb (77.1 kg)   07/24/20 129 lb (58.5 kg)   07/19/19 169 lb (76.7 kg)     Body mass index is 30.11 kg/m². CBC:   Lab Results   Component Value Date    WBC 9.5 08/27/2021    RBC 3.73 08/27/2021    HGB 10.7 08/27/2021    HCT 33.0 08/27/2021    MCV 88.5 08/27/2021    RDW 13.0 08/27/2021     08/27/2021       CMP:   Lab Results   Component Value Date     12/07/2020    K 4.4 12/07/2020    K 3.8 07/25/2020     12/07/2020    CO2 25 12/07/2020    BUN 11 12/07/2020    CREATININE 0.7 12/07/2020    GFRAA >60 12/07/2020    LABGLOM >60 12/07/2020    GLUCOSE 90 12/07/2020    PROT 7.0 12/07/2020    CALCIUM 9.9 12/07/2020    BILITOT 0.8 12/07/2020    ALKPHOS 40 12/07/2020    AST 14 12/07/2020    ALT 9 12/07/2020       POC Tests: No results for input(s): POCGLU, POCNA, POCK, POCCL, POCBUN, POCHEMO, POCHCT in the last 72 hours.     Coags: No results found for: PROTIME, INR, APTT    HCG (If Applicable): No results found for: PREGTESTUR, PREGSERUM, HCG, HCGQUANT     ABGs: No results found for: PHART, PO2ART, TWD9WOM, FUZ0YUT, BEART, O8LUQHWM     Type & Screen (If Applicable):  No results found for: LABABO, LABRH    Drug/Infectious Status (If Applicable):  No results found for: HIV, HEPCAB    COVID-19 Screening (If Applicable): No results found for: COVID19        Anesthesia Evaluation  Patient summary reviewed and Nursing notes reviewed no history of anesthetic complications:   Airway: Mallampati: II  TM distance: >3 FB   Neck ROM: full  Mouth opening: > = 3 FB Dental: normal exam Pulmonary:Negative Pulmonary ROS and normal exam                               Cardiovascular:Negative CV ROS            Rhythm: regular  Rate: normal                    Neuro/Psych:   Negative Neuro/Psych ROS              GI/Hepatic/Renal: Neg GI/Hepatic/Renal ROS            Endo/Other: Negative Endo/Other ROS                    Abdominal:             Vascular: negative vascular ROS. Other Findings:             Anesthesia Plan      epidural     ASA 2             Anesthetic plan and risks discussed with patient. Plan discussed with CRNA.     Attending anesthesiologist reviewed and agrees with Preprocedure content              TONI Ferris - CRNA   8/27/2021

## 2021-08-27 NOTE — LACTATION NOTE
Experienced breastfeeding mother-nursed 2 other children x 3 mos. Patient reports Hx of breast reduction surgery in 2009 and issues with low milk supply with other children. She noticed more breast growth with this pregnancy and desires to breastfeed successfully. Reviewed signs of adequate milk intake stressing the importance of frequent feedings, expected I & O and adequate weight gain. Patient has baby scale at home-encouraged pre/post feed weights. Instructed on normal infant behavior in the first 12-24 hrs, benefits of skin to skin and components of safe positioning, encouraged rooming-in and avoidance of pacifier use until breastfeeding is well established. Reviewed latch techniques, positioning,  waking techniques and the importance of frequent feedings- 8-12 times/ 24 hrs to stimulate/maintain milk production. Knows hand expression and encouraged to express drops of colostrum at start of feeding. Reviewed feeding cues and expected urine/stool output and transition. Encouraged to feed infant as often and for as long as the infant wishes to do so listening for audible swallows during feeding. Reviewed [place] learning tom. Offered support and encouraged to call for assistance or concerns. Has electric breast pump at home. Patient requests latch assessment at next feeding.

## 2021-08-27 NOTE — PROGRESS NOTES
Pt brought over by L+D via [place] in stable condition. Was able to transfer self into bed. Oriented to room and unit including unit policies and infant safety and covid 19 . Alert and oriented x 3 and denies pain,  Fundus firm, 1 finger above and midline. Sm amt lochia rubra on sean pad.

## 2021-08-27 NOTE — H&P
CHIEF COMPLAINT:  Here for IOL    HISTORY OF PRESENT ILLNESS:      The patient is a 36 y.o. female at 44w2d.   OB History        3    Para   2    Term   2            AB        Living   1       SAB        TAB        Ectopic        Molar        Multiple   0    Live Births   1            Patient presents with a chief complaint as above and is being admitted for induction    Estimated Due Date: Estimated Date of Delivery: 21    PRENATAL CARE:    Complicated by: none    PAST OB HISTORY  OB History        3    Para   2    Term   2            AB        Living   1       SAB        TAB        Ectopic        Molar        Multiple   0    Live Births   1                Past Medical History:        Diagnosis Date    Abnormal Pap smear of cervix     hpv leep      Past Surgical History:        Procedure Laterality Date    BREAST REDUCTION SURGERY      BREAST REDUCTION SURGERY      LEEP  2012    WISDOM TOOTH EXTRACTION       Allergies:  Bactrim [sulfamethoxazole-trimethoprim] and Sulfa antibiotics  Social History:    Social History     Socioeconomic History    Marital status:      Spouse name: Not on file    Number of children: Not on file    Years of education: Not on file    Highest education level: Not on file   Occupational History    Not on file   Tobacco Use    Smoking status: Never Smoker    Smokeless tobacco: Never Used   Vaping Use    Vaping Use: Never used   Substance and Sexual Activity    Alcohol use: No     Comment: social    Drug use: No    Sexual activity: Yes     Partners: Male   Other Topics Concern    Not on file   Social History Narrative    Not on file     Social Determinants of Health     Financial Resource Strain:     Difficulty of Paying Living Expenses:    Food Insecurity:     Worried About Running Out of Food in the Last Year:     Ran Out of Food in the Last Year:    Transportation Needs:     Lack of Transportation (Medical):    Mary Gilliam Lack of Transportation (Non-Medical):    Physical Activity:     Days of Exercise per Week:     Minutes of Exercise per Session:    Stress:     Feeling of Stress :    Social Connections:     Frequency of Communication with Friends and Family:     Frequency of Social Gatherings with Friends and Family:     Attends Anabaptist Services:     Active Member of Clubs or Organizations:     Attends Club or Organization Meetings:     Marital Status:    Intimate Partner Violence:     Fear of Current or Ex-Partner:     Emotionally Abused:     Physically Abused:     Sexually Abused:      Family History:       Problem Relation Age of Onset    No Known Problems Mother     Hypertension Father     No Known Problems Sister     Cancer Paternal Grandmother     Cancer Paternal Grandfather      Medications Prior to Admission:  Medications Prior to Admission: omeprazole (PRILOSEC) 10 MG delayed release capsule, Take 10 mg by mouth daily  Prenatal Vit-Fe Fumarate-FA (PRENATAL 1+1 PO), Take 1 tablet by mouth daily as needed Indications: Tabes Dorsalis 1    REVIEW OF SYSTEMS:    CONSTITUTIONAL:  negative  RESPIRATORY:  negative  CARDIOVASCULAR:  negative  GASTROINTESTINAL:  negative  ALLERGIC/IMMUNOLOGIC:  negative  NEUROLOGICAL:  negative  BEHAVIOR/PSYCH:  negative    PHYSICAL EXAM:  Vitals:    08/27/21 0730 08/27/21 0751 08/27/21 0929 08/27/21 0931   BP:  117/64 (!) 110/56 (!) 95/50   Pulse:  75 78 77   Temp:  97.8 °F (36.6 °C)     Weight: 170 lb (77.1 kg)      Height: 5' 3\" (1.6 m)        General appearance:  awake, alert, cooperative, no apparent distress, and appears stated age  Neurologic:  Awake, alert, oriented to name, place and time. Lungs:  No increased work of breathing, good air exchange  Abdomen:  Soft, non tender, gravid, consistent with her gestational age   Fetal heart rate:  Reassuring.   Pelvis:  Adequate pelvis  Cervix: 3 cm 50% medium -3  Contraction frequency:  0 minutes    Membranes: Intact    ASSESSMENT AND PLAN:  IUP at term  Here for IOL    Labor: Admit,  routine labor orders  Fetus: Reassuring  GBS: No  Other: IV hydration and antiemetics, pitocin, NPO after midnight, R, B, A and possible complications discussed

## 2021-08-27 NOTE — L&D DELIVERY SUMMARY NOTE
Vaginal Delivery Note    Details of Procedure: The patient is a 36 y.o. female at 44w2d   OB History        3    Para   2    Term   2            AB        Living   1       SAB        TAB        Ectopic        Molar        Multiple   0    Live Births   1             who was admitted for induction. She received the following interventions: ARBOW and IV Pitocin induction She was known to be GBS negative and did not receive antibiotic prophylaxis. The patient progressed well,did receive an epidural, became complete and started to push. After pushing for 30 mins, kiwi vacuum was used secondary to poor maternal pushing and asynclitic/persistent OP position  the fetal head was at the perineum, nose and mouth suctioned with bulb suction and the rest of the infant delivered atraumatically, placed on mother abdomen. Cord was clamped and cut and infant handed off to the waiting nurse for evaluation. The delivery of the placenta was spontaneous. The perineum and vagina were explored and a first degree laceration was repaired in standard fashion. Male infant delivered at [phone] apgars of 7 and 9    Anesthesia:  epidural anesthesia    Estimated blood loss:  300ml    Specimen:  Placenta not sent to pathology     Cord blood sent Yes    Complications:  none    Condition:  infant stable to general nursery    Sejal Tripp MD M.D.  FACOG

## 2021-08-28 VITALS
SYSTOLIC BLOOD PRESSURE: 106 MMHG | RESPIRATION RATE: 18 BRPM | OXYGEN SATURATION: 97 % | WEIGHT: 170 LBS | DIASTOLIC BLOOD PRESSURE: 51 MMHG | TEMPERATURE: 98.6 F | BODY MASS INDEX: 30.12 KG/M2 | HEIGHT: 63 IN | HEART RATE: 79 BPM

## 2021-08-28 LAB
HCT VFR BLD CALC: 29.6 % (ref 34–48)
HEMOGLOBIN: 9.5 G/DL (ref 11.5–15.5)

## 2021-08-28 PROCEDURE — 7200000001 HC VAGINAL DELIVERY

## 2021-08-28 PROCEDURE — 6370000000 HC RX 637 (ALT 250 FOR IP): Performed by: OBSTETRICS & GYNECOLOGY

## 2021-08-28 PROCEDURE — 85014 HEMATOCRIT: CPT

## 2021-08-28 PROCEDURE — 85018 HEMOGLOBIN: CPT

## 2021-08-28 PROCEDURE — 36415 COLL VENOUS BLD VENIPUNCTURE: CPT

## 2021-08-28 RX ORDER — IBUPROFEN 800 MG/1
800 TABLET ORAL EVERY 8 HOURS
Qty: 30 TABLET | Refills: 0 | Status: SHIPPED | OUTPATIENT
Start: 2021-08-28 | End: 2022-08-23

## 2021-08-28 RX ADMIN — ACETAMINOPHEN 650 MG: 325 TABLET ORAL at 07:59

## 2021-08-28 RX ADMIN — HYDROCODONE BITARTRATE AND ACETAMINOPHEN 1 TABLET: 5; 325 TABLET ORAL at 03:10

## 2021-08-28 RX ADMIN — DOCUSATE SODIUM 100 MG: 100 CAPSULE ORAL at 07:59

## 2021-08-28 RX ADMIN — FERROUS SULFATE TAB 325 MG (65 MG ELEMENTAL FE) 325 MG: 325 (65 FE) TAB at 07:59

## 2021-08-28 ASSESSMENT — PAIN SCALES - GENERAL
PAINLEVEL_OUTOF10: 4
PAINLEVEL_OUTOF10: 0
PAINLEVEL_OUTOF10: 4

## 2021-08-28 NOTE — PROGRESS NOTES
Postpartum Day 1: Vaginal Delivery    The patient feels well. Pain is well controlled with current medications. Baby is feeding via breast.     Objective:        Vitals:    08/28/21 0734   BP: (!) 106/51   Pulse: 79   Resp: 18   Temp: 98.6 °F (37 °C)   SpO2: 97%         Lab Results   Component Value Date    WBC 9.5 08/27/2021    HGB 9.5 (L) 08/28/2021    HCT 29.6 (L) 08/28/2021    MCV 88.5 08/27/2021     08/27/2021       General:    alert, appears stated age and cooperative   Lochia:  appropriate   Uterine    firm   DVT Evaluation:  No evidence of DVT seen on physical exam.     Assessment:     Status post Vaginal Delivery. good    Plan:     Continue current care.

## 2021-08-28 NOTE — ANESTHESIA POSTPROCEDURE EVALUATION
Department of Anesthesiology  Postprocedure Note    Patient: Mary Angel  MRN: [id number]  YOB: 1990  Date of evaluation: 8/28/2021  Time:  12:53 PM     Procedure Summary     Date: 08/27/21 Room / Location:     Anesthesia Start: 0900 Anesthesia Stop: 9153    Procedure: Labor Analgesia Diagnosis:     Scheduled Providers:  Responsible Provider: Ghada Asher MD    Anesthesia Type: epidural ASA Status: 2          Anesthesia Type: epidural    Brianna Phase I:      Brianna Phase II:      Last vitals: Reviewed and per EMR flowsheets.        Anesthesia Post Evaluation    Patient location during evaluation: bedside  Patient participation: complete - patient participated  Level of consciousness: awake and alert  Pain score: 0  Airway patency: patent  Nausea & Vomiting: no nausea and no vomiting  Complications: no  Cardiovascular status: blood pressure returned to baseline  Respiratory status: acceptable  Hydration status: euvolemic

## 2021-08-28 NOTE — PROGRESS NOTES
Discharged home with baby via [address] [facility] [address]. Infant and mother home in satisfactory condition.

## 2021-08-28 NOTE — LACTATION NOTE
Family being discharged now. Mom stated she is starting to get a little soreness on her nipples. Encouraged her to use breast milk to heal sore nipples. Also discussed keeping baby's hips close to her body during feeds. Encouraged her to call with breastfeeding questions.   Bhupendra, [facility]

## 2021-08-30 NOTE — DISCHARGE SUMMARY
Obstetrical Discharge Form        Patient ID:  Heather Avers  [id number]  29 y.o.  1990    Admit date: 2021    Discharge date: 2021     Admitting Physician: Delia Martinez MD    Discharge Diagnoses: 39 weeks gestation of pregnancy [Z3A.39]    Final Diagnosis:   Active Problems:    39 weeks gestation of pregnancy  Resolved Problems:    * No resolved hospital problems. *      Discharged Condition: good      Gestational Age:39w2d    Antepartum complications: none    Date of Delivery:  21    Type of Delivery: vaginal, spontaneous    Delivered By: Lydia Diallo MD         Baby:     Information for the patient's :  Rohit Jones [[id number]]          Anesthesia: Epidural    Intrapartum complications: None    Feeding method: breast    Hospital Course: Uneventful.   Patient recovered well without any issues     Discharged Condition: stable to home    Discharge Medication:    Racquel Espinosa   Home Medication Instructions [id number]    Printed on:21 0222   Medication Information                      ibuprofen (ADVIL;MOTRIN) 800 MG tablet  Take 1 tablet by mouth every 8 hours             Prenatal Vit-Fe Fumarate-FA (PRENATAL 1+1 PO)  Take 1 tablet by mouth daily as needed Indications: Tabes Dorsalis 1                  Postpartum complications: none    Note that over 30 minutes was spent in preparing discharge papers, discussing discharge with patient, medication review, etc.    Discharge Date: 2021    Plan:   Follow up in 6 week(s)

## 2022-08-23 ENCOUNTER — OFFICE VISIT (OUTPATIENT)
Dept: FAMILY MEDICINE CLINIC | Age: 32
End: 2022-08-23
Payer: COMMERCIAL

## 2022-08-23 VITALS
OXYGEN SATURATION: 96 % | RESPIRATION RATE: 15 BRPM | SYSTOLIC BLOOD PRESSURE: 104 MMHG | HEIGHT: 63 IN | HEART RATE: 56 BPM | TEMPERATURE: 97.5 F | DIASTOLIC BLOOD PRESSURE: 58 MMHG | BODY MASS INDEX: 30.11 KG/M2

## 2022-08-23 DIAGNOSIS — I73.00 RAYNAUD'S DISEASE WITHOUT GANGRENE: ICD-10-CM

## 2022-08-23 DIAGNOSIS — K90.41 GLUTEN INTOLERANCE: ICD-10-CM

## 2022-08-23 DIAGNOSIS — A53.0 POSITIVE RPR TEST: ICD-10-CM

## 2022-08-23 DIAGNOSIS — Z00.00 PREVENTATIVE HEALTH CARE: Primary | ICD-10-CM

## 2022-08-23 DIAGNOSIS — Z00.00 PREVENTATIVE HEALTH CARE: ICD-10-CM

## 2022-08-23 DIAGNOSIS — K42.9 UMBILICAL HERNIA WITHOUT OBSTRUCTION AND WITHOUT GANGRENE: ICD-10-CM

## 2022-08-23 DIAGNOSIS — K64.9 HEMORRHOIDS, UNSPECIFIED HEMORRHOID TYPE: ICD-10-CM

## 2022-08-23 LAB
ALBUMIN SERPL-MCNC: 4.8 G/DL (ref 3.5–5.2)
ALP BLD-CCNC: 42 U/L (ref 35–104)
ALT SERPL-CCNC: 11 U/L (ref 0–32)
ANION GAP SERPL CALCULATED.3IONS-SCNC: 13 MMOL/L (ref 7–16)
AST SERPL-CCNC: 15 U/L (ref 0–31)
BASOPHILS ABSOLUTE: 0.06 E9/L (ref 0–0.2)
BASOPHILS RELATIVE PERCENT: 0.9 % (ref 0–2)
BILIRUB SERPL-MCNC: 0.6 MG/DL (ref 0–1.2)
BUN BLDV-MCNC: 10 MG/DL (ref 6–20)
CALCIUM SERPL-MCNC: 9.6 MG/DL (ref 8.6–10.2)
CHLORIDE BLD-SCNC: 105 MMOL/L (ref 98–107)
CHOLESTEROL, TOTAL: 170 MG/DL (ref 0–199)
CO2: 23 MMOL/L (ref 22–29)
CREAT SERPL-MCNC: 0.8 MG/DL (ref 0.5–1)
EOSINOPHILS ABSOLUTE: 0.1 E9/L (ref 0.05–0.5)
EOSINOPHILS RELATIVE PERCENT: 1.6 % (ref 0–6)
GFR AFRICAN AMERICAN: >60
GFR NON-AFRICAN AMERICAN: >60 ML/MIN/1.73
GLUCOSE BLD-MCNC: 92 MG/DL (ref 74–99)
HCT VFR BLD CALC: 39.8 % (ref 34–48)
HDLC SERPL-MCNC: 58 MG/DL
HEMOGLOBIN: 13.1 G/DL (ref 11.5–15.5)
IMMATURE GRANULOCYTES #: 0.02 E9/L
IMMATURE GRANULOCYTES %: 0.3 % (ref 0–5)
LDL CHOLESTEROL CALCULATED: 100 MG/DL (ref 0–99)
LYMPHOCYTES ABSOLUTE: 1.91 E9/L (ref 1.5–4)
LYMPHOCYTES RELATIVE PERCENT: 29.7 % (ref 20–42)
MCH RBC QN AUTO: 30.8 PG (ref 26–35)
MCHC RBC AUTO-ENTMCNC: 32.9 % (ref 32–34.5)
MCV RBC AUTO: 93.4 FL (ref 80–99.9)
MONOCYTES ABSOLUTE: 0.54 E9/L (ref 0.1–0.95)
MONOCYTES RELATIVE PERCENT: 8.4 % (ref 2–12)
NEUTROPHILS ABSOLUTE: 3.81 E9/L (ref 1.8–7.3)
NEUTROPHILS RELATIVE PERCENT: 59.1 % (ref 43–80)
PDW BLD-RTO: 13.7 FL (ref 11.5–15)
PLATELET # BLD: 140 E9/L (ref 130–450)
PMV BLD AUTO: 13.5 FL (ref 7–12)
POTASSIUM SERPL-SCNC: 3.9 MMOL/L (ref 3.5–5)
RBC # BLD: 4.26 E12/L (ref 3.5–5.5)
SODIUM BLD-SCNC: 141 MMOL/L (ref 132–146)
T4 FREE: 1.1 NG/DL (ref 0.93–1.7)
TOTAL PROTEIN: 7.2 G/DL (ref 6.4–8.3)
TRIGL SERPL-MCNC: 59 MG/DL (ref 0–149)
TSH SERPL DL<=0.05 MIU/L-ACNC: 2.96 UIU/ML (ref 0.27–4.2)
VLDLC SERPL CALC-MCNC: 12 MG/DL
WBC # BLD: 6.4 E9/L (ref 4.5–11.5)

## 2022-08-23 PROCEDURE — 99395 PREV VISIT EST AGE 18-39: CPT | Performed by: FAMILY MEDICINE

## 2022-08-23 ASSESSMENT — PATIENT HEALTH QUESTIONNAIRE - PHQ9
1. LITTLE INTEREST OR PLEASURE IN DOING THINGS: 0
2. FEELING DOWN, DEPRESSED OR HOPELESS: 0
SUM OF ALL RESPONSES TO PHQ QUESTIONS 1-9: 0
SUM OF ALL RESPONSES TO PHQ9 QUESTIONS 1 & 2: 0

## 2022-08-23 NOTE — PROGRESS NOTES
visit:    [facility] health care  -     CBC with Auto Differential; Future  -     Comprehensive Metabolic Panel; Future  -     Lipid Panel; Future  -     TSH; Future  -     T4, Free; Future    Raynaud's disease without gangrene  -     T4, Free; Future    Umbilical hernia without obstruction and without gangrene  -     Zee Wilkerson MD, General Surgery, [place]    Hemorrhoids, unspecified hemorrhoid type  -     Zee Wilkerson MD, General Surgery, [facility]    Gluten intolerance  -     RPR; Future  -     TISSUE TRANSGLUTAMINASE, IGA; Future    Positive RPR test  -     RPR;  Future     Labs  Keep hands and feet warm  Referral to Dr. Jc Kramer MD

## 2022-08-24 LAB — RPR: NORMAL

## 2022-08-26 LAB — TISSUE TRANSGLUTAMINASE IGA: 0.4 U/ML

## 2022-10-13 ENCOUNTER — OFFICE VISIT (OUTPATIENT)
Dept: SURGERY | Age: 32
End: 2022-10-13
Payer: COMMERCIAL

## 2022-10-13 VITALS
HEIGHT: 63 IN | RESPIRATION RATE: 18 BRPM | WEIGHT: 132 LBS | SYSTOLIC BLOOD PRESSURE: 103 MMHG | HEART RATE: 56 BPM | OXYGEN SATURATION: 98 % | DIASTOLIC BLOOD PRESSURE: 73 MMHG | BODY MASS INDEX: 23.39 KG/M2

## 2022-10-13 DIAGNOSIS — K62.5 RECTAL BLEEDING: ICD-10-CM

## 2022-10-13 DIAGNOSIS — Z87.19 HISTORY OF UMBILICAL HERNIA: Primary | ICD-10-CM

## 2022-10-13 PROCEDURE — 99204 OFFICE O/P NEW MOD 45 MIN: CPT | Performed by: SURGERY

## 2022-10-13 RX ORDER — HYDROCORTISONE 25 MG/G
CREAM TOPICAL
Qty: 28 G | Refills: 1 | Status: SHIPPED | OUTPATIENT
Start: 2022-10-13

## 2022-10-13 NOTE — PATIENT INSTRUCTIONS
High fiber diet - 20-25 grams a day  Increase water intake  Fiber supplement 2-3 times day    Tucks wipes  Miralax - 1 cap  Anusol

## 2022-10-13 NOTE — PROGRESS NOTES
History and Physical - General Surgery    Patient's Name/Date of Birth: Kamala Paz / 1990    Date: 10/13/2022    PCP: Piedad Ford MD    Referring Physician:   Lisseth Lopez MD  [phone]      CHIEF COMPLAINT:    Chief Complaint   Patient presents with    New Patient     Umbilical hernia/hemorrhoids          HISTORY OF PRESENT ILLNESS:    Kamala Paz is an 29 y.o. female who presents with an umbilical hernia. She said it is small and has been there a while. It does not bother her. She does not think it has been getting larger. The patient said she is having rectal bleeding. She said they flare up sometimes. She said she feels them protruding sometimes. She said this happens every few months. She said she has intermittent constipation which leads to this. She mike any pain with BM. Past Medical History:   Past Medical History:   Diagnosis Date    Abnormal Pap smear of cervix     hpv leep         Past Surgical History:   Past Surgical History:   Procedure Laterality Date    BREAST REDUCTION SURGERY  2009    BREAST REDUCTION SURGERY  2009    LEEP  2012    WISDOM TOOTH EXTRACTION  2014        Allergies: Bactrim [sulfamethoxazole-trimethoprim] and Sulfa antibiotics     Medications:   No current outpatient medications on file. No current facility-administered medications for this visit.          Social History:   Social History     Tobacco Use    Smoking status: Never    Smokeless tobacco: Never   Substance Use Topics    Alcohol use: No     Comment: social        Family History:   Family History   Problem Relation Age of Onset    No Known Problems Mother     Hypertension Father     No Known Problems Sister     Cancer Paternal Grandmother     Cancer Paternal Grandfather        REVIEW OF SYSTEMS:    Constitutional: negative  Eyes: negative  Ears, nose, mouth, throat, and face: negative  Respiratory: negative  Cardiovascular: negative  Gastrointestinal: as in HPI  Genitourinary:negative  Integument/breast: as in hpi  Hematologic/lymphatic: negative  Musculoskeletal:negative  Neurological: negative  Allergic/Immunologic: negative    PHYSICAL EXAM   /73   Pulse 56   Resp 18   Ht 5' 3\" (1.6 m)   Wt 132 lb (59.9 kg)   SpO2 98%   BMI 23.38 kg/m²     General appearance: alert, cooperative and in no acute distress. Eyes: Grossly normal   Lungs: Normal work of breathing  Heart: regular rate  Abdomen: small reducible umbilical hernia, soft, non-tender, non distended  Skin: No skin abnormalities  Neurologic: Alert and oriented x 3. Grossly normal  Musculoskeletal: No edema. ASSESSMENT AND PLAN:     Efren Mccarthy is an 29 y.o. female who presents with:    1: Constipation, rectal bleeding -   I will set the patient up for a colonoscopy, possible biopsy, possible polypectomy. I explained the risks including but not limited to bowel perforation, postoperative bleeding, post-polypectomy syndrome, as well as the possibility of needing emergency surgery or another colonoscopy. The benefits, alternatives, and potential complications associated with the above procedure to be performed and transfusions when applicable with the patient/responsible person prior to the procedure. All of the patient's questions were answered. The patient understands and agrees to the procedure. 2) Umbilical hernia - small, discussed options of surgery. The patient would like to wait.      Physician Signature: Electronically signed by Sowmya Lara MD, General Surgery    Send copy of H&P to PCP, Pedro Pastrana MD and referring physician, Pavel Kwon MD

## 2022-10-26 RX ORDER — SODIUM CHLORIDE 9 MG/ML
INJECTION, SOLUTION INTRAVENOUS CONTINUOUS
OUTPATIENT
Start: 2022-10-26

## 2023-02-09 ENCOUNTER — OFFICE VISIT (OUTPATIENT)
Dept: OBGYN | Age: 33
End: 2023-02-09
Payer: COMMERCIAL

## 2023-02-09 VITALS
HEIGHT: 55 IN | BODY MASS INDEX: 31.52 KG/M2 | WEIGHT: 136.2 LBS | HEART RATE: 72 BPM | DIASTOLIC BLOOD PRESSURE: 59 MMHG | SYSTOLIC BLOOD PRESSURE: 98 MMHG

## 2023-02-09 DIAGNOSIS — N93.9 ABNORMAL UTERINE BLEEDING (AUB): Primary | ICD-10-CM

## 2023-02-09 DIAGNOSIS — N64.3 GALACTORRHEA: ICD-10-CM

## 2023-02-09 PROCEDURE — 99203 OFFICE O/P NEW LOW 30 MIN: CPT | Performed by: OBSTETRICS & GYNECOLOGY

## 2023-02-09 SDOH — ECONOMIC STABILITY: HOUSING INSECURITY
IN THE LAST 12 MONTHS, WAS THERE A TIME WHEN YOU DID NOT HAVE A STEADY PLACE TO SLEEP OR SLEPT IN A SHELTER (INCLUDING NOW)?: NO

## 2023-02-09 SDOH — ECONOMIC STABILITY: FOOD INSECURITY: WITHIN THE PAST 12 MONTHS, THE FOOD YOU BOUGHT JUST DIDN'T LAST AND YOU DIDN'T HAVE MONEY TO GET MORE.: NEVER TRUE

## 2023-02-09 SDOH — ECONOMIC STABILITY: INCOME INSECURITY: HOW HARD IS IT FOR YOU TO PAY FOR THE VERY BASICS LIKE FOOD, HOUSING, MEDICAL CARE, AND HEATING?: NOT VERY HARD

## 2023-02-09 SDOH — ECONOMIC STABILITY: FOOD INSECURITY: WITHIN THE PAST 12 MONTHS, YOU WORRIED THAT YOUR FOOD WOULD RUN OUT BEFORE YOU GOT MONEY TO BUY MORE.: NEVER TRUE

## 2023-02-09 NOTE — PROGRESS NOTES
HISTORY OF PRESENT ILLNESS:    29 y.o. female  [id number] presents with complaint of heavy periods as well as breakthrough bleeding. Periods are q 1 month lasting for 4 days. All 4 days are heavy with clots. She then has spotting a few days later. Not currently on birth control,  had vasectomy. Pt also c/o b/l nipple discharge. When she squeezes both breasts milk comes out. She denies pain or lumps. Past Medical History:   Past Medical History:   Diagnosis Date    Abnormal Pap smear of cervix     hpv leep , normal paps since                                             OB History    Para Term  AB Living   3 3 3     2   SAB IAB Ectopic Molar Multiple Live Births           0 2      # Outcome Date GA Lbr Rafael/2nd Weight Sex Delivery Anes PTL Lv   3 Term 21 39w2d / 00:43 7 lb 13.6 oz (3.56 kg) M Vag-Spont EPI N JUANA   2 Term 19 39w4d / 00:20 7 lb 4.8 oz (3.31 kg) F Vag-Spont EPI N JUANA   1 Term 18 40w0d   F Vag-Spont EPI N          Past Surgical History:   Past Surgical History:   Procedure Laterality Date    BREAST REDUCTION SURGERY      BREAST REDUCTION SURGERY      LEEP      WISDOM TOOTH EXTRACTION          Allergies: Bactrim [sulfamethoxazole-trimethoprim] and Sulfa antibiotics     Medications:   Current Outpatient Medications   Medication Sig Dispense Refill    hydrocortisone (ANUSOL-HC) 2.5 % CREA rectal cream Use 1-2 times a day as needed (Patient not taking: Reported on 2023) 28 g 1     No current facility-administered medications for this visit.          Social History:   Social History     Tobacco Use    Smoking status: Never    Smokeless tobacco: Never   Substance Use Topics    Alcohol use: No     Comment: social        Family History:   Family History   Problem Relation Age of Onset    No Known Problems Mother     Hypertension Father     No Known Problems Sister     Cancer Paternal Grandmother     Cancer Paternal Grandfather        REVIEW OF SYSTEMS:    Constitutional: negative  HEENT: negative  Breast: b/l nipple discharge  Respiratory: negative  Cardiovascular: negative  Gastrointestinal: negative  Genitourinary: As per HPI  Integument: negative  Neurological: negative  Endocrine: negative          PHYSICAL EXAM  BP (!) 98/59 (Position: Sitting)   Pulse 72   Ht 3' (0.914 m)   Wt 136 lb 3.2 oz (61.8 kg)   LMP 01/28/2023   BMI 73.89 kg/m²   Patient's last menstrual period was 01/28/2023. General appearance: alert, cooperative and in no acute distress. Head: NCAT   Abdomen: soft, non-tender; bowel sounds normal; no masses,  no organomegaly  Psych: No acute distress, mood and affect full range  Neuro: Alert and oriented, no focal deficits   Breasts: nontender, no masses palpable bilaterally, no dimpling, +milky discharge from both breasts with stimulation  Pelvic Exam:   EXTERNAL GENITALIA: normal external genitalia, no external lesions  VAGINA: normal rugae, no discharge   CERVIX: normal appearing, no lesions, no bleeding  UTERUS: uterus is normal size, shape, consistency and nontender   ADNEXA: normal adnexa in size, nontender and no masses. ASSESSMENT :      Diagnosis Orders   1. Abnormal uterine bleeding (AUB)  PAP SMEAR    CBC    Prolactin    TSH    US NON OB TRANSVAGINAL    HCG, QUANTITATIVE, PREGNANCY    Comprehensive Metabolic Panel      2. Galactorrhea  Prolactin    HCG, QUANTITATIVE, PREGNANCY    Comprehensive Metabolic Panel           PLAN:    Return in about 4 weeks (around 3/9/2023). No orders of the defined types were placed in this encounter. Orders Placed This Encounter   Procedures    US NON OB TRANSVAGINAL     Standing Status:   Future     Standing Expiration Date:   2/9/2024    PAP SMEAR     Order Specific Question:   Collection Type     Answer:    Thin Prep     Order Specific Question:   Prior Abnormal Pap Test     Answer:   No     Order Specific Question:   Screening or Diagnostic     Answer:   Screening Order Specific Question:   Additional STD Testing     Answer:   GC and Chlamydia     Order Specific Question:   Diagnosis Code for Additional STD Testing     Answer:   Screening for Chlamydial disease (Z11.8)     Order Specific Question:   HPV Requested?      Answer:   HPV Co-Test     Order Specific Question:   High Risk Patient     Answer:   N/A    CBC     Standing Status:   Future     Standing Expiration Date:   2/9/2024    Prolactin     Standing Status:   Future     Standing Expiration Date:   2/9/2024    TSH     Standing Status:   Future     Standing Expiration Date:   2/9/2024    HCG, QUANTITATIVE, PREGNANCY     Standing Status:   Future     Standing Expiration Date:   2/9/2024    Comprehensive Metabolic Panel     Standing Status:   Future     Standing Expiration Date:   2/9/2024         Electronically signed by Laron French DO on 2/9/23

## 2023-02-09 NOTE — PROGRESS NOTES
New patient here today to establish care. Has heavy periods that are irregular with spotting. Did not breast feed and milk still not dried up since birth in 8/2021. Patient was a previous patient of this provider and Dr. Dionte Rucker. Last pap was some time in 2020. Pap smear obtained, labeled and sent to the lab. Directed to the lab to obtain ordered labs. Discharge instructions have been discussed with the patient. Patient advised to call our office with any questions or concerns. Voiced understanding.

## 2023-02-10 LAB
HPV SAMPLE: NORMAL
SOURCE: NORMAL
SOURCE: NORMAL

## 2023-02-20 DIAGNOSIS — N64.3 GALACTORRHEA: ICD-10-CM

## 2023-02-20 DIAGNOSIS — N93.9 ABNORMAL UTERINE BLEEDING (AUB): ICD-10-CM

## 2023-02-20 LAB
ALBUMIN SERPL-MCNC: 4.5 G/DL (ref 3.5–5.2)
ALP BLD-CCNC: 37 U/L (ref 35–104)
ALT SERPL-CCNC: 13 U/L (ref 0–32)
ANION GAP SERPL CALCULATED.3IONS-SCNC: 13 MMOL/L (ref 7–16)
AST SERPL-CCNC: 20 U/L (ref 0–31)
BILIRUB SERPL-MCNC: 0.6 MG/DL (ref 0–1.2)
BUN BLDV-MCNC: 13 MG/DL (ref 6–20)
CALCIUM SERPL-MCNC: 9.4 MG/DL (ref 8.6–10.2)
CHLORIDE BLD-SCNC: 103 MMOL/L (ref 98–107)
CO2: 24 MMOL/L (ref 22–29)
CREAT SERPL-MCNC: 0.8 MG/DL (ref 0.5–1)
GFR SERPL CREATININE-BSD FRML MDRD: >60 ML/MIN/1.73
GLUCOSE BLD-MCNC: 81 MG/DL (ref 74–99)
GONADOTROPIN, CHORIONIC (HCG) QUANT: <0.1 MIU/ML
HCT VFR BLD CALC: 41.9 % (ref 34–48)
HEMOGLOBIN: 13.8 G/DL (ref 11.5–15.5)
MCH RBC QN AUTO: 31.3 PG (ref 26–35)
MCHC RBC AUTO-ENTMCNC: 32.9 % (ref 32–34.5)
MCV RBC AUTO: 95 FL (ref 80–99.9)
PDW BLD-RTO: 13.1 FL (ref 11.5–15)
PLATELET # BLD: 141 E9/L (ref 130–450)
PMV BLD AUTO: 13.8 FL (ref 7–12)
POTASSIUM SERPL-SCNC: 4.2 MMOL/L (ref 3.5–5)
PROLACTIN: 18.16 NG/ML
RBC # BLD: 4.41 E12/L (ref 3.5–5.5)
SODIUM BLD-SCNC: 140 MMOL/L (ref 132–146)
TOTAL PROTEIN: 6.7 G/DL (ref 6.4–8.3)
TSH SERPL DL<=0.05 MIU/L-ACNC: 2.7 UIU/ML (ref 0.27–4.2)
WBC # BLD: 5.2 E9/L (ref 4.5–11.5)

## 2023-03-09 ENCOUNTER — OFFICE VISIT (OUTPATIENT)
Dept: OBGYN | Age: 33
End: 2023-03-09
Payer: COMMERCIAL

## 2023-03-09 VITALS
WEIGHT: 135 LBS | BODY MASS INDEX: 73.24 KG/M2 | SYSTOLIC BLOOD PRESSURE: 103 MMHG | HEART RATE: 79 BPM | DIASTOLIC BLOOD PRESSURE: 59 MMHG

## 2023-03-09 DIAGNOSIS — N64.4 PAIN OF BOTH BREASTS: ICD-10-CM

## 2023-03-09 DIAGNOSIS — N64.3 GALACTORRHEA: ICD-10-CM

## 2023-03-09 DIAGNOSIS — N93.9 ABNORMAL UTERINE BLEEDING (AUB): Primary | ICD-10-CM

## 2023-03-09 PROCEDURE — 99212 OFFICE O/P EST SF 10 MIN: CPT | Performed by: OBSTETRICS & GYNECOLOGY

## 2023-03-09 PROCEDURE — 99213 OFFICE O/P EST LOW 20 MIN: CPT | Performed by: OBSTETRICS & GYNECOLOGY

## 2023-03-09 NOTE — PROGRESS NOTES
HISTORY OF PRESENT ILLNESS:    Pt presents for follow up for heavy periods as well as breakthrough bleeding. Periods are q 1 month lasting for 4 days. All 4 days are heavy with clots. She then has spotting a few days later. Not currently on birth control,  had vasectomy. Pt also c/o b/l nipple discharge. When she squeezes both breasts milk comes out. She denies pain or lumps. .    Labs wnl. US showed heterogenous parenchyma.    Past Medical History:   Past Medical History:   Diagnosis Date    Abnormal Pap smear of cervix     hpv leep , normal paps since                                             OB History    Para Term  AB Living   3 3 3     2   SAB IAB Ectopic Molar Multiple Live Births           0 2      # Outcome Date GA Lbr Rafael/2nd Weight Sex Delivery Anes PTL Lv   3 Term 21 39w2d / 00:43 7 lb 13.6 oz (3.56 kg) M Vag-Spont EPI N JUANA   2 Term 19 39w4d / 00:20 7 lb 4.8 oz (3.31 kg) F Vag-Spont EPI N JUANA   1 Term 18 40w0d   F Vag-Spont EPI N          Past Surgical History:   Past Surgical History:   Procedure Laterality Date    BREAST REDUCTION SURGERY      BREAST REDUCTION SURGERY      LEEP      WISDOM TOOTH EXTRACTION          Allergies: Bactrim [sulfamethoxazole-trimethoprim] and Sulfa antibiotics     Medications:   Current Outpatient Medications   Medication Sig Dispense Refill    hydrocortisone (ANUSOL-HC) 2.5 % CREA rectal cream Use 1-2 times a day as needed (Patient not taking: No sig reported) 28 g 1     No current facility-administered medications for this visit.         Social History:   Social History     Tobacco Use    Smoking status: Never    Smokeless tobacco: Never   Substance Use Topics    Alcohol use: No     Comment: social        Family History:   Family History   Problem Relation Age of Onset    No Known Problems Mother     Hypertension Father     No Known Problems Sister     Cancer Paternal Grandmother     Cancer Paternal Grandfather   REVIEW OF SYSTEMS:    Constitutional: negative  HEENT: negative  Breast: negative  Respiratory: negative  Cardiovascular: negative  Gastrointestinal: negative  Genitourinary:negative  Integument: negative  Neurological: negative  Endocrine: negative          PHYSICAL EXAM  BP (!) 103/59   Pulse 79   Wt 135 lb (61.2 kg)   LMP 02/28/2023   BMI 73.24 kg/m²   Patient's last menstrual period was 02/28/2023. General appearance: alert, cooperative and in no acute distress. Head: NCAT   Psych: No acute distress, mood and affect full range  Neuro: Alert and oriented, no focal deficits          ASSESSMENT :   Diagnosis Orders   1. Abnormal uterine bleeding (AUB)  Luteinizing Hormone    Follicle Stimulating Hormone    Testosterone, free, total    DHEA-Sulfate    Estradiol      2. Galactorrhea  DEUCE DIGITAL DIAGNOSTIC W OR WO CAD BILATERAL    US BREAST BILATERAL COMPLETE      3. Pain of both breasts  DEUCE DIGITAL DIAGNOSTIC W OR WO CAD BILATERAL    US BREAST BILATERAL COMPLETE           PLAN:  Offered medical mgmt with birth control vs lysteda. Pt declines medical management a this time. States she does not do well with birth control. Return in about 1 year (around 3/9/2024) for Annual exam.      No orders of the defined types were placed in this encounter.       Orders Placed This Encounter   Procedures    DEUCE DIGITAL DIAGNOSTIC W OR WO CAD BILATERAL     Standing Status:   Future     Standing Expiration Date:   5/8/2024     Scheduling Instructions:      [facility] PROTOCOL    US BREAST BILATERAL COMPLETE     Standing Status:   Future     Standing Expiration Date:   3/9/2024    Luteinizing Hormone     Standing Status:   Future     Standing Expiration Date:   5/9/4578    Follicle Stimulating Hormone     Standing Status:   Future     Standing Expiration Date:   3/9/2024    Testosterone, free, total     Standing Status:   Future     Standing Expiration Date:   3/9/2024    DHEA-Sulfate     Standing Status:   Future Standing Expiration Date:   3/9/2024    Estradiol     Standing Status:   Future     Standing Expiration Date:   3/9/2024           Electronically signed by Leydi Leong DO on 3/9/23

## 2023-12-05 ENCOUNTER — PATIENT MESSAGE (OUTPATIENT)
Dept: FAMILY MEDICINE CLINIC | Age: 33
End: 2023-12-05

## 2023-12-05 RX ORDER — ACYCLOVIR 50 MG/G
OINTMENT TOPICAL
Qty: 30 G | Refills: 1 | Status: SHIPPED | OUTPATIENT
Start: 2023-12-05 | End: 2023-12-12

## 2023-12-05 NOTE — TELEPHONE ENCOUNTER
From: Dacia Blanc  To: Dr. Jerzy Painting: 12/5/2023 8:38 AM EST  Subject: Barbara Jhaveri. I have a cold sore on my lip I usually get one about once every 5 years but wondering if there was any medication that I could take to keep this one from growing. Thanks!

## 2025-01-22 ASSESSMENT — PATIENT HEALTH QUESTIONNAIRE - PHQ9
1. LITTLE INTEREST OR PLEASURE IN DOING THINGS: NOT AT ALL
SUM OF ALL RESPONSES TO PHQ9 QUESTIONS 1 & 2: 0
SUM OF ALL RESPONSES TO PHQ9 QUESTIONS 1 & 2: 0
SUM OF ALL RESPONSES TO PHQ QUESTIONS 1-9: 0
1. LITTLE INTEREST OR PLEASURE IN DOING THINGS: NOT AT ALL
SUM OF ALL RESPONSES TO PHQ QUESTIONS 1-9: 0
2. FEELING DOWN, DEPRESSED OR HOPELESS: NOT AT ALL
2. FEELING DOWN, DEPRESSED OR HOPELESS: NOT AT ALL

## 2025-01-23 ENCOUNTER — OFFICE VISIT (OUTPATIENT)
Dept: FAMILY MEDICINE CLINIC | Age: 35
End: 2025-01-23

## 2025-01-23 VITALS
BODY MASS INDEX: 74.86 KG/M2 | TEMPERATURE: 98.6 F | DIASTOLIC BLOOD PRESSURE: 70 MMHG | SYSTOLIC BLOOD PRESSURE: 118 MMHG | RESPIRATION RATE: 16 BRPM | OXYGEN SATURATION: 99 % | WEIGHT: 138 LBS | HEART RATE: 96 BPM

## 2025-01-23 DIAGNOSIS — Z98.890 HISTORY OF BILATERAL BREAST REDUCTION SURGERY: ICD-10-CM

## 2025-01-23 DIAGNOSIS — R20.2 PARESTHESIAS: ICD-10-CM

## 2025-01-23 DIAGNOSIS — R53.82 CHRONIC FATIGUE: ICD-10-CM

## 2025-01-23 DIAGNOSIS — R53.1 WEAKNESS: ICD-10-CM

## 2025-01-23 DIAGNOSIS — Q76.2 CONGENITAL SPONDYLOLISTHESIS OF LUMBAR REGION: ICD-10-CM

## 2025-01-23 DIAGNOSIS — I73.00 RAYNAUD'S DISEASE WITHOUT GANGRENE: ICD-10-CM

## 2025-01-23 DIAGNOSIS — Z82.69 FAMILY HISTORY OF SYSTEMIC LUPUS ERYTHEMATOSUS: ICD-10-CM

## 2025-01-23 DIAGNOSIS — R92.30 DENSE BREAST TISSUE: ICD-10-CM

## 2025-01-23 DIAGNOSIS — Z00.00 ENCOUNTER FOR WELL ADULT EXAM WITHOUT ABNORMAL FINDINGS: Primary | ICD-10-CM

## 2025-01-23 DIAGNOSIS — R25.3 EYE MUSCLE TWITCHES: ICD-10-CM

## 2025-01-23 PROBLEM — K52.9 GASTROENTERITIS: Status: RESOLVED | Noted: 2020-07-24 | Resolved: 2025-01-23

## 2025-01-23 PROBLEM — K52.9 IBD (INFLAMMATORY BOWEL DISEASE): Status: RESOLVED | Noted: 2020-07-26 | Resolved: 2025-01-23

## 2025-01-23 PROBLEM — Z34.93 NORMAL PREGNANCY IN THIRD TRIMESTER: Status: RESOLVED | Noted: 2017-12-16 | Resolved: 2025-01-23

## 2025-01-23 PROBLEM — Z34.90 NORMAL PREGNANCY, ANTEPARTUM: Status: RESOLVED | Noted: 2019-07-19 | Resolved: 2025-01-23

## 2025-01-23 PROBLEM — O26.893 ABDOMINAL PAIN DURING PREGNANCY, THIRD TRIMESTER: Status: RESOLVED | Noted: 2019-06-05 | Resolved: 2025-01-23

## 2025-01-23 PROBLEM — Z3A.33 33 WEEKS GESTATION OF PREGNANCY: Status: RESOLVED | Noted: 2021-07-20 | Resolved: 2025-01-23

## 2025-01-23 PROBLEM — R10.9 ABDOMINAL PAIN DURING PREGNANCY, THIRD TRIMESTER: Status: RESOLVED | Noted: 2019-06-05 | Resolved: 2025-01-23

## 2025-01-23 PROBLEM — Z3A.39 39 WEEKS GESTATION OF PREGNANCY: Status: RESOLVED | Noted: 2018-02-13 | Resolved: 2025-01-23

## 2025-01-23 PROBLEM — O36.8190 DECREASED FETAL MOVEMENT AFFECTING MANAGEMENT OF MOTHER, ANTEPARTUM: Status: RESOLVED | Noted: 2021-08-16 | Resolved: 2025-01-23

## 2025-01-23 RX ORDER — M-VIT,TX,IRON,MINS/CALC/FOLIC 27MG-0.4MG
1 TABLET ORAL DAILY
COMMUNITY

## 2025-01-23 SDOH — ECONOMIC STABILITY: FOOD INSECURITY: WITHIN THE PAST 12 MONTHS, YOU WORRIED THAT YOUR FOOD WOULD RUN OUT BEFORE YOU GOT MONEY TO BUY MORE.: NEVER TRUE

## 2025-01-23 SDOH — ECONOMIC STABILITY: FOOD INSECURITY: WITHIN THE PAST 12 MONTHS, THE FOOD YOU BOUGHT JUST DIDN'T LAST AND YOU DIDN'T HAVE MONEY TO GET MORE.: NEVER TRUE

## 2025-01-23 NOTE — PROGRESS NOTES
Well Adult Note  Name: Leonor Eckert Today’s Date: 2025   MRN: [id number] Sex: Female   Age: 34 y.o. Ethnicity: Non- / Non    : 1990 Race: White (non-)      Leonor Eckert is here for a well adult exam.          Assessment & Plan  1. Spondylosis.  A lumbar x-ray will be ordered today to evaluate the condition of her spine, especially after having three births.    2. Breast lumps.  A diagnostic bilateral mammogram and a diagnostic ultrasound will be ordered at [facility] to establish a baseline and address her concerns about scar tissue and potential abnormalities.    3. Raynaud's phenomenon.  Blood work will be conducted to rule out other potential causes for her symptoms, including autoimmune conditions. She has been advised to seek immediate medical attention at the emergency room if she experiences an acute flare-up of her symptoms, particularly if she experiences weakness or vision loss. If the blood work results do not indicate an obvious cause, an MRI may be considered. EMG in future if symptoms persist.    4. Migraines.  Labs, if neg MRI brain    5. Health maintenance.  She has been advised to schedule an appointment with [facility] for [facility]ological needs. Fasting blood work will be conducted to check for thyroid function and other potential issues. She has been instructed to fast but may consume water, black coffee, or black tea without cream or sugar.    PROCEDURE  The patient underwent a breast reduction procedure at the age of 20.    Encounter for well adult exam without abnormal findings  -     CBC with Auto Differential; Future  -     Comprehensive Metabolic Panel; Future  -     Lipid Panel; Future  -     TSH; Future  -     T4, Free; Future  -     Vitamin B12; Future  -     Vitamin D 25 Hydroxy; Future  -     Rheumatoid Factor; Future  -     Sedimentation Rate; Future  -     C-Reactive Protein; Future  -     JEANNE; Future  -

## 2025-01-24 DIAGNOSIS — R53.1 WEAKNESS: ICD-10-CM

## 2025-01-24 DIAGNOSIS — R20.2 PARESTHESIAS: ICD-10-CM

## 2025-01-24 DIAGNOSIS — Z00.00 ENCOUNTER FOR WELL ADULT EXAM WITHOUT ABNORMAL FINDINGS: ICD-10-CM

## 2025-01-24 DIAGNOSIS — R25.3 EYE MUSCLE TWITCHES: ICD-10-CM

## 2025-01-24 DIAGNOSIS — R53.82 CHRONIC FATIGUE: ICD-10-CM

## 2025-01-24 DIAGNOSIS — Z82.69 FAMILY HISTORY OF SYSTEMIC LUPUS ERYTHEMATOSUS: ICD-10-CM

## 2025-01-24 DIAGNOSIS — I73.00 RAYNAUD'S DISEASE WITHOUT GANGRENE: ICD-10-CM

## 2025-01-24 LAB
ALBUMIN: 4.4 G/DL (ref 3.5–5.2)
ALP BLD-CCNC: 41 U/L (ref 35–104)
ALT SERPL-CCNC: 17 U/L (ref 0–32)
ANION GAP SERPL CALCULATED.3IONS-SCNC: 14 MMOL/L (ref 7–16)
AST SERPL-CCNC: 20 U/L (ref 0–31)
BASOPHILS ABSOLUTE: 0.04 K/UL (ref 0–0.2)
BASOPHILS RELATIVE PERCENT: 1 % (ref 0–2)
BILIRUB SERPL-MCNC: 0.7 MG/DL (ref 0–1.2)
BUN BLDV-MCNC: 13 MG/DL (ref 6–20)
C-REACTIVE PROTEIN: <3 MG/L (ref 0–5)
CALCIUM SERPL-MCNC: 9.5 MG/DL (ref 8.6–10.2)
CHLORIDE BLD-SCNC: 104 MMOL/L (ref 98–107)
CHOLESTEROL, TOTAL: 170 MG/DL
CO2: 23 MMOL/L (ref 22–29)
CREAT SERPL-MCNC: 0.7 MG/DL (ref 0.5–1)
EOSINOPHILS ABSOLUTE: 0.1 K/UL (ref 0.05–0.5)
EOSINOPHILS RELATIVE PERCENT: 2 % (ref 0–6)
GFR, ESTIMATED: >90 ML/MIN/1.73M2
GLUCOSE BLD-MCNC: 89 MG/DL (ref 74–99)
HCT VFR BLD CALC: 42.7 % (ref 34–48)
HDLC SERPL-MCNC: 62 MG/DL
HEMOGLOBIN: 13.9 G/DL (ref 11.5–15.5)
IMMATURE GRANULOCYTES %: 0 % (ref 0–5)
IMMATURE GRANULOCYTES ABSOLUTE: <0.03 K/UL (ref 0–0.58)
LDL CHOLESTEROL: 101 MG/DL
LYMPHOCYTES ABSOLUTE: 1.61 K/UL (ref 1.5–4)
LYMPHOCYTES RELATIVE PERCENT: 29 % (ref 20–42)
MAGNESIUM: 2 MG/DL (ref 1.6–2.6)
MCH RBC QN AUTO: 31.3 PG (ref 26–35)
MCHC RBC AUTO-ENTMCNC: 32.6 G/DL (ref 32–34.5)
MCV RBC AUTO: 96.2 FL (ref 80–99.9)
MONOCYTES ABSOLUTE: 0.36 K/UL (ref 0.1–0.95)
MONOCYTES RELATIVE PERCENT: 6 % (ref 2–12)
NEUTROPHILS ABSOLUTE: 3.51 K/UL (ref 1.8–7.3)
NEUTROPHILS RELATIVE PERCENT: 62 % (ref 43–80)
PDW BLD-RTO: 12.9 % (ref 11.5–15)
PLATELET, FLUORESCENCE: 154 K/UL (ref 130–450)
PMV BLD AUTO: 13.1 FL (ref 7–12)
POTASSIUM SERPL-SCNC: 4.2 MMOL/L (ref 3.5–5)
RBC # BLD: 4.44 M/UL (ref 3.5–5.5)
RHEUMATOID FACTOR: <10 IU/ML (ref 0–13)
SODIUM BLD-SCNC: 141 MMOL/L (ref 132–146)
T4 FREE: 1.4 NG/DL (ref 0.9–1.7)
TOTAL PROTEIN: 7.2 G/DL (ref 6.4–8.3)
TRIGL SERPL-MCNC: 37 MG/DL
TSH SERPL DL<=0.05 MIU/L-ACNC: 2.15 UIU/ML (ref 0.27–4.2)
VITAMIN B-12: 766 PG/ML (ref 211–946)
VITAMIN D 25-HYDROXY: 31.8 NG/ML (ref 30–100)
VLDLC SERPL CALC-MCNC: 7 MG/DL
WBC # BLD: 5.6 K/UL (ref 4.5–11.5)

## 2025-01-25 LAB — SED RATE, AUTOMATED: 7 MM/HR (ref 0–20)

## 2025-01-27 LAB
ANTI-NUCLEAR ANTIBODY (ANA): NEGATIVE
BORRELIA BURGDORFERI ABS TOTAL: 0.25 IV
THYROGLOBULIN AB: 56 IU/ML (ref 0–40)
THYROID PEROXIDASE (TPO) AB: 269 IU/ML (ref 0–25)

## 2025-01-28 ENCOUNTER — PATIENT MESSAGE (OUTPATIENT)
Dept: FAMILY MEDICINE CLINIC | Age: 35
End: 2025-01-28

## 2025-01-28 DIAGNOSIS — E06.3 HASHIMOTO'S DISEASE: Primary | ICD-10-CM

## 2025-01-30 RX ORDER — LEVOTHYROXINE SODIUM 25 UG/1
25 TABLET ORAL DAILY
Qty: 30 TABLET | Refills: 1 | Status: SHIPPED | OUTPATIENT
Start: 2025-01-30

## 2025-03-20 ENCOUNTER — OFFICE VISIT (OUTPATIENT)
Dept: FAMILY MEDICINE CLINIC | Age: 35
End: 2025-03-20

## 2025-03-20 VITALS
WEIGHT: 147 LBS | DIASTOLIC BLOOD PRESSURE: 70 MMHG | OXYGEN SATURATION: 98 % | HEIGHT: 63 IN | SYSTOLIC BLOOD PRESSURE: 116 MMHG | BODY MASS INDEX: 26.05 KG/M2 | TEMPERATURE: 97.8 F | RESPIRATION RATE: 16 BRPM | HEART RATE: 81 BPM

## 2025-03-20 DIAGNOSIS — Z83.3 FAMILY HISTORY OF DIABETES MELLITUS: ICD-10-CM

## 2025-03-20 DIAGNOSIS — E06.3 HASHIMOTO'S DISEASE: Primary | ICD-10-CM

## 2025-03-20 DIAGNOSIS — E06.3 HASHIMOTO'S DISEASE: ICD-10-CM

## 2025-03-20 DIAGNOSIS — I73.00 RAYNAUD'S DISEASE WITHOUT GANGRENE: ICD-10-CM

## 2025-03-20 LAB
HBA1C MFR BLD: 4.7 % (ref 4–5.6)
T4 FREE: 1.4 NG/DL (ref 0.9–1.7)
TSH SERPL DL<=0.05 MIU/L-ACNC: 2.02 UIU/ML (ref 0.27–4.2)

## 2025-03-20 RX ORDER — LEVOTHYROXINE SODIUM 25 UG/1
25 TABLET ORAL DAILY
Qty: 90 TABLET | Refills: 1 | Status: SHIPPED | OUTPATIENT
Start: 2025-03-20

## 2025-03-20 NOTE — PROGRESS NOTES
[facility]    Leonor Eckert presents to the office today for   Chief Complaint   Patient presents with    Follow-up     6 week follow up thyroid       History of Present Illness  The patient presents for hypothyroidism.    She reports an improvement in her condition following the initiation of levothyroxine therapy, with a significant reduction in cold intolerance and increased energy levels. Prior to the treatment, she experienced severe weakness, fatigue, and cold intolerance. She has been adhering to the prescribed regimen of levothyroxine 25 mcg for approximately one month, taking it consistently in the morning before meals with water. She does not experience any new symptoms such as fever, nausea, vomiting, chest pain, or shortness of breath. Additionally, she does not report any edema in her extremities. She continues to struggle with constipation and weight loss, despite not gaining weight. She also does not experience any palpitations. She has been diagnosed with Raynaud's phenomenon, which has shown improvement with the levothyroxine therapy.    Fam hx of diabetes, seeks a1c    MEDICATIONS  Levothyroxine    Review of Systems     /70   Pulse 81   Temp 97.8 °F (36.6 °C) (Temporal)   Resp 16   Ht 1.6 m (5' 3\")   Wt 66.7 kg (147 lb)   SpO2 98%   BMI 26.04 kg/m²   Physical Exam  Constitutional:       Appearance: Normal appearance.   HENT:      Head: Normocephalic and atraumatic.   Eyes:      Extraocular Movements: Extraocular movements intact.      Conjunctiva/sclera: Conjunctivae normal.   Cardiovascular:      Rate and Rhythm: Normal rate.   Pulmonary:      Effort: Pulmonary effort is normal.   Skin:     General: Skin is warm.   Neurological:      Mental Status: She is alert and oriented to person, place, and time.   Psychiatric:         Mood and Affect: Mood normal.         Behavior: Behavior normal.            Current Outpatient Medications:     levothyroxine (SYNTHROID) 25 MCG

## 2025-03-21 ENCOUNTER — RESULTS FOLLOW-UP (OUTPATIENT)
Dept: FAMILY MEDICINE CLINIC | Age: 35
End: 2025-03-21

## 2025-04-22 ENCOUNTER — TELEPHONE (OUTPATIENT)
Dept: FAMILY MEDICINE CLINIC | Age: 35
End: 2025-04-22

## 2025-04-22 NOTE — TELEPHONE ENCOUNTER
Pita kate from [facility] regarding the order they have for this patient's mammogram.       She stated that this mammogram should be ordered as a Screening Mammogram unless the patient has a lump or breast pain.

## 2025-04-24 ENCOUNTER — HOSPITAL ENCOUNTER (OUTPATIENT)
Dept: GENERAL RADIOLOGY | Age: 35
Discharge: HOME | End: 2025-04-26
Payer: COMMERCIAL

## 2025-04-24 ENCOUNTER — TELEPHONE (OUTPATIENT)
Dept: FAMILY MEDICINE CLINIC | Age: 35
End: 2025-04-24

## 2025-04-24 VITALS — HEIGHT: 62 IN | BODY MASS INDEX: 25.76 KG/M2 | WEIGHT: 140 LBS

## 2025-04-24 DIAGNOSIS — R92.8 ABNORMAL MAMMOGRAM: Primary | ICD-10-CM

## 2025-04-24 DIAGNOSIS — Z98.890 HISTORY OF BILATERAL BREAST REDUCTION SURGERY: ICD-10-CM

## 2025-04-24 DIAGNOSIS — R92.30 DENSE BREAST TISSUE: ICD-10-CM

## 2025-04-24 DIAGNOSIS — R92.8 ABNORMAL MAMMOGRAM: ICD-10-CM

## 2025-04-24 PROCEDURE — G0279 TOMOSYNTHESIS, MAMMO: HCPCS

## 2025-04-24 PROCEDURE — 76642 ULTRASOUND BREAST LIMITED: CPT

## 2025-04-25 ENCOUNTER — RESULTS FOLLOW-UP (OUTPATIENT)
Dept: FAMILY MEDICINE CLINIC | Age: 35
End: 2025-04-25

## 2025-04-25 NOTE — RESULT ENCOUNTER NOTE
Please advise patient of normal mammogram and breast ultrasound, both showing benign findings..  Bre[facility] care center probably already went over this with her, but want to make sure she indeed received the news.  Follow-up per Dr. Tabor's recommendations

## 2025-08-06 ENCOUNTER — PATIENT MESSAGE (OUTPATIENT)
Dept: FAMILY MEDICINE CLINIC | Age: 35
End: 2025-08-06

## 2025-09-03 DIAGNOSIS — Z83.3 FAMILY HISTORY OF DIABETES MELLITUS: ICD-10-CM

## 2025-09-03 DIAGNOSIS — E06.3 HASHIMOTO'S DISEASE: ICD-10-CM

## 2025-09-03 RX ORDER — LEVOTHYROXINE SODIUM 25 UG/1
25 TABLET ORAL DAILY
Qty: 90 TABLET | Refills: 1 | Status: SHIPPED | OUTPATIENT
Start: 2025-09-03